# Patient Record
Sex: MALE | URBAN - METROPOLITAN AREA
[De-identification: names, ages, dates, MRNs, and addresses within clinical notes are randomized per-mention and may not be internally consistent; named-entity substitution may affect disease eponyms.]

---

## 2020-09-20 ENCOUNTER — APPOINTMENT (OUTPATIENT)
Dept: RADIOLOGY | Facility: MEDICAL CENTER | Age: 60
DRG: 177 | End: 2020-09-20

## 2020-09-20 ENCOUNTER — HOSPITAL ENCOUNTER (INPATIENT)
Facility: MEDICAL CENTER | Age: 60
LOS: 4 days | DRG: 177 | End: 2020-09-24
Attending: EMERGENCY MEDICINE | Admitting: HOSPITALIST

## 2020-09-20 DIAGNOSIS — J12.82 PNEUMONIA DUE TO COVID-19 VIRUS: ICD-10-CM

## 2020-09-20 DIAGNOSIS — U07.1 COVID-19 VIRUS INFECTION: ICD-10-CM

## 2020-09-20 DIAGNOSIS — R09.02 HYPOXIA: ICD-10-CM

## 2020-09-20 DIAGNOSIS — J98.4 PNEUMONITIS: ICD-10-CM

## 2020-09-20 DIAGNOSIS — U07.1 ACUTE RESPIRATORY DISEASE DUE TO 2019 NOVEL CORONAVIRUS: ICD-10-CM

## 2020-09-20 DIAGNOSIS — J06.9 ACUTE RESPIRATORY DISEASE DUE TO 2019 NOVEL CORONAVIRUS: ICD-10-CM

## 2020-09-20 DIAGNOSIS — U07.1 PNEUMONIA DUE TO COVID-19 VIRUS: ICD-10-CM

## 2020-09-20 PROBLEM — E11.9 DM (DIABETES MELLITUS) (HCC): Status: ACTIVE | Noted: 2020-09-20

## 2020-09-20 LAB
ALBUMIN SERPL BCP-MCNC: 3.6 G/DL (ref 3.2–4.9)
ALBUMIN/GLOB SERPL: 1 G/DL
ALP SERPL-CCNC: 66 U/L (ref 30–99)
ALT SERPL-CCNC: 21 U/L (ref 2–50)
ANION GAP SERPL CALC-SCNC: 17 MMOL/L (ref 7–16)
APTT PPP: 27.7 SEC (ref 24.7–36)
AST SERPL-CCNC: 28 U/L (ref 12–45)
BASOPHILS # BLD AUTO: 0 % (ref 0–1.8)
BASOPHILS # BLD: 0 K/UL (ref 0–0.12)
BILIRUB SERPL-MCNC: 0.5 MG/DL (ref 0.1–1.5)
BUN SERPL-MCNC: 13 MG/DL (ref 8–22)
CALCIUM SERPL-MCNC: 9 MG/DL (ref 8.5–10.5)
CHLORIDE SERPL-SCNC: 97 MMOL/L (ref 96–112)
CK SERPL-CCNC: 67 U/L (ref 0–154)
CO2 SERPL-SCNC: 21 MMOL/L (ref 20–33)
CREAT SERPL-MCNC: 0.7 MG/DL (ref 0.5–1.4)
CRP SERPL HS-MCNC: 10.19 MG/DL (ref 0–0.75)
D DIMER PPP IA.FEU-MCNC: 0.56 UG/ML (FEU) (ref 0–0.5)
EKG IMPRESSION: NORMAL
EOSINOPHIL # BLD AUTO: 0 K/UL (ref 0–0.51)
EOSINOPHIL NFR BLD: 0 % (ref 0–6.9)
ERYTHROCYTE [DISTWIDTH] IN BLOOD BY AUTOMATED COUNT: 38.5 FL (ref 35.9–50)
ERYTHROCYTE [SEDIMENTATION RATE] IN BLOOD BY WESTERGREN METHOD: 76 MM/HOUR (ref 0–20)
EST. AVERAGE GLUCOSE BLD GHB EST-MCNC: 269 MG/DL
FERRITIN SERPL-MCNC: 1797 NG/ML (ref 22–322)
GLOBULIN SER CALC-MCNC: 3.5 G/DL (ref 1.9–3.5)
GLUCOSE BLD-MCNC: 288 MG/DL (ref 65–99)
GLUCOSE SERPL-MCNC: 264 MG/DL (ref 65–99)
HBA1C MFR BLD: 11 % (ref 0–5.6)
HCT VFR BLD AUTO: 45.1 % (ref 42–52)
HGB BLD-MCNC: 15.6 G/DL (ref 14–18)
INR PPP: 1.08 (ref 0.87–1.13)
LACTATE BLD-SCNC: 1.7 MMOL/L (ref 0.5–2)
LDH SERPL L TO P-CCNC: 351 U/L (ref 107–266)
LG PLATELETS BLD QL SMEAR: NORMAL
LYMPHOCYTES # BLD AUTO: 1.24 K/UL (ref 1–4.8)
LYMPHOCYTES NFR BLD: 17.5 % (ref 22–41)
MAGNESIUM SERPL-MCNC: 1.9 MG/DL (ref 1.5–2.5)
MANUAL DIFF BLD: NORMAL
MCH RBC QN AUTO: 29.7 PG (ref 27–33)
MCHC RBC AUTO-ENTMCNC: 34.6 G/DL (ref 33.7–35.3)
MCV RBC AUTO: 85.7 FL (ref 81.4–97.8)
MONOCYTES # BLD AUTO: 0.56 K/UL (ref 0–0.85)
MONOCYTES NFR BLD AUTO: 7.9 % (ref 0–13.4)
MORPHOLOGY BLD-IMP: NORMAL
NEUTROPHILS # BLD AUTO: 5.3 K/UL (ref 1.82–7.42)
NEUTROPHILS NFR BLD: 74.6 % (ref 44–72)
NRBC # BLD AUTO: 0 K/UL
NRBC BLD-RTO: 0 /100 WBC
NT-PROBNP SERPL IA-MCNC: 170 PG/ML (ref 0–125)
PHOSPHATE SERPL-MCNC: 2.4 MG/DL (ref 2.5–4.5)
PLATELET # BLD AUTO: 255 K/UL (ref 164–446)
PLATELET BLD QL SMEAR: NORMAL
PMV BLD AUTO: 10 FL (ref 9–12.9)
POTASSIUM SERPL-SCNC: 3.4 MMOL/L (ref 3.6–5.5)
PROCALCITONIN SERPL-MCNC: <0.05 NG/ML
PROT SERPL-MCNC: 7.1 G/DL (ref 6–8.2)
PROTHROMBIN TIME: 14.3 SEC (ref 12–14.6)
RBC # BLD AUTO: 5.26 M/UL (ref 4.7–6.1)
RBC BLD AUTO: PRESENT
SODIUM SERPL-SCNC: 135 MMOL/L (ref 135–145)
TROPONIN T SERPL-MCNC: 13 NG/L (ref 6–19)
WBC # BLD AUTO: 7.1 K/UL (ref 4.8–10.8)

## 2020-09-20 PROCEDURE — 99285 EMERGENCY DEPT VISIT HI MDM: CPT

## 2020-09-20 PROCEDURE — 80053 COMPREHEN METABOLIC PANEL: CPT

## 2020-09-20 PROCEDURE — 85007 BL SMEAR W/DIFF WBC COUNT: CPT

## 2020-09-20 PROCEDURE — 83520 IMMUNOASSAY QUANT NOS NONAB: CPT

## 2020-09-20 PROCEDURE — 36415 COLL VENOUS BLD VENIPUNCTURE: CPT

## 2020-09-20 PROCEDURE — 83880 ASSAY OF NATRIURETIC PEPTIDE: CPT

## 2020-09-20 PROCEDURE — 82728 ASSAY OF FERRITIN: CPT

## 2020-09-20 PROCEDURE — 83615 LACTATE (LD) (LDH) ENZYME: CPT

## 2020-09-20 PROCEDURE — 87040 BLOOD CULTURE FOR BACTERIA: CPT

## 2020-09-20 PROCEDURE — 770006 HCHG ROOM/CARE - MED/SURG/GYN SEMI*

## 2020-09-20 PROCEDURE — 82962 GLUCOSE BLOOD TEST: CPT

## 2020-09-20 PROCEDURE — 85652 RBC SED RATE AUTOMATED: CPT

## 2020-09-20 PROCEDURE — 99221 1ST HOSP IP/OBS SF/LOW 40: CPT | Performed by: HOSPITALIST

## 2020-09-20 PROCEDURE — 93005 ELECTROCARDIOGRAM TRACING: CPT | Performed by: EMERGENCY MEDICINE

## 2020-09-20 PROCEDURE — 83735 ASSAY OF MAGNESIUM: CPT

## 2020-09-20 PROCEDURE — 82550 ASSAY OF CK (CPK): CPT

## 2020-09-20 PROCEDURE — A9270 NON-COVERED ITEM OR SERVICE: HCPCS | Performed by: HOSPITALIST

## 2020-09-20 PROCEDURE — 84484 ASSAY OF TROPONIN QUANT: CPT

## 2020-09-20 PROCEDURE — 86140 C-REACTIVE PROTEIN: CPT

## 2020-09-20 PROCEDURE — 85730 THROMBOPLASTIN TIME PARTIAL: CPT

## 2020-09-20 PROCEDURE — 85610 PROTHROMBIN TIME: CPT

## 2020-09-20 PROCEDURE — 83605 ASSAY OF LACTIC ACID: CPT

## 2020-09-20 PROCEDURE — 71045 X-RAY EXAM CHEST 1 VIEW: CPT

## 2020-09-20 PROCEDURE — 84145 PROCALCITONIN (PCT): CPT

## 2020-09-20 PROCEDURE — 85379 FIBRIN DEGRADATION QUANT: CPT

## 2020-09-20 PROCEDURE — 83036 HEMOGLOBIN GLYCOSYLATED A1C: CPT

## 2020-09-20 PROCEDURE — 85027 COMPLETE CBC AUTOMATED: CPT

## 2020-09-20 PROCEDURE — 84100 ASSAY OF PHOSPHORUS: CPT

## 2020-09-20 PROCEDURE — 700102 HCHG RX REV CODE 250 W/ 637 OVERRIDE(OP): Performed by: HOSPITALIST

## 2020-09-20 RX ORDER — BISACODYL 10 MG
10 SUPPOSITORY, RECTAL RECTAL
Status: DISCONTINUED | OUTPATIENT
Start: 2020-09-20 | End: 2020-09-24 | Stop reason: HOSPADM

## 2020-09-20 RX ORDER — INSULIN GLARGINE 100 [IU]/ML
15 INJECTION, SOLUTION SUBCUTANEOUS EVERY EVENING
Status: DISCONTINUED | OUTPATIENT
Start: 2020-09-20 | End: 2020-09-21

## 2020-09-20 RX ORDER — POLYETHYLENE GLYCOL 3350 17 G/17G
1 POWDER, FOR SOLUTION ORAL
Status: DISCONTINUED | OUTPATIENT
Start: 2020-09-20 | End: 2020-09-24 | Stop reason: HOSPADM

## 2020-09-20 RX ORDER — DEXTROSE MONOHYDRATE 25 G/50ML
50 INJECTION, SOLUTION INTRAVENOUS
Status: DISCONTINUED | OUTPATIENT
Start: 2020-09-20 | End: 2020-09-21

## 2020-09-20 RX ORDER — DEXAMETHASONE 6 MG/1
6 TABLET ORAL DAILY
Status: DISCONTINUED | OUTPATIENT
Start: 2020-09-21 | End: 2020-09-24 | Stop reason: HOSPADM

## 2020-09-20 RX ORDER — BENZONATATE 200 MG/1
200 CAPSULE ORAL 3 TIMES DAILY PRN
Status: ON HOLD | COMMUNITY
End: 2020-09-24 | Stop reason: SDUPTHER

## 2020-09-20 RX ORDER — AMOXICILLIN 250 MG
2 CAPSULE ORAL 2 TIMES DAILY
Status: DISCONTINUED | OUTPATIENT
Start: 2020-09-20 | End: 2020-09-24 | Stop reason: HOSPADM

## 2020-09-20 RX ORDER — ACETAMINOPHEN 325 MG/1
650 TABLET ORAL EVERY 6 HOURS PRN
Status: DISCONTINUED | OUTPATIENT
Start: 2020-09-20 | End: 2020-09-24 | Stop reason: HOSPADM

## 2020-09-20 RX ADMIN — INSULIN GLARGINE 15 UNITS: 100 INJECTION, SOLUTION SUBCUTANEOUS at 23:06

## 2020-09-20 RX ADMIN — DOCUSATE SODIUM 50 MG AND SENNOSIDES 8.6 MG 2 TABLET: 8.6; 5 TABLET, FILM COATED ORAL at 23:08

## 2020-09-20 SDOH — ECONOMIC STABILITY: FOOD INSECURITY: WITHIN THE PAST 12 MONTHS, YOU WORRIED THAT YOUR FOOD WOULD RUN OUT BEFORE YOU GOT MONEY TO BUY MORE.: PATIENT DECLINED

## 2020-09-20 SDOH — HEALTH STABILITY: MENTAL HEALTH: HOW OFTEN DO YOU HAVE 6 OR MORE DRINKS ON ONE OCCASION?: NEVER

## 2020-09-20 SDOH — HEALTH STABILITY: MENTAL HEALTH: HOW OFTEN DO YOU HAVE A DRINK CONTAINING ALCOHOL?: NEVER

## 2020-09-20 SDOH — ECONOMIC STABILITY: FOOD INSECURITY: WITHIN THE PAST 12 MONTHS, THE FOOD YOU BOUGHT JUST DIDN'T LAST AND YOU DIDN'T HAVE MONEY TO GET MORE.: PATIENT DECLINED

## 2020-09-20 SDOH — ECONOMIC STABILITY: TRANSPORTATION INSECURITY
IN THE PAST 12 MONTHS, HAS THE LACK OF TRANSPORTATION KEPT YOU FROM MEDICAL APPOINTMENTS OR FROM GETTING MEDICATIONS?: PATIENT DECLINED

## 2020-09-20 SDOH — HEALTH STABILITY: MENTAL HEALTH: HOW MANY STANDARD DRINKS CONTAINING ALCOHOL DO YOU HAVE ON A TYPICAL DAY?: PATIENT DECLINED

## 2020-09-20 SDOH — ECONOMIC STABILITY: TRANSPORTATION INSECURITY
IN THE PAST 12 MONTHS, HAS LACK OF TRANSPORTATION KEPT YOU FROM MEETINGS, WORK, OR FROM GETTING THINGS NEEDED FOR DAILY LIVING?: PATIENT DECLINED

## 2020-09-20 ASSESSMENT — COGNITIVE AND FUNCTIONAL STATUS - GENERAL
DAILY ACTIVITIY SCORE: 24
MOBILITY SCORE: 24
SUGGESTED CMS G CODE MODIFIER DAILY ACTIVITY: CH
SUGGESTED CMS G CODE MODIFIER MOBILITY: CH
MOBILITY SCORE: 24
SUGGESTED CMS G CODE MODIFIER MOBILITY: CH

## 2020-09-20 ASSESSMENT — PATIENT HEALTH QUESTIONNAIRE - PHQ9
1. LITTLE INTEREST OR PLEASURE IN DOING THINGS: NOT AT ALL
2. FEELING DOWN, DEPRESSED, IRRITABLE, OR HOPELESS: NOT AT ALL
SUM OF ALL RESPONSES TO PHQ9 QUESTIONS 1 AND 2: 0

## 2020-09-20 ASSESSMENT — FIBROSIS 4 INDEX: FIB4 SCORE: 1.49

## 2020-09-20 ASSESSMENT — LIFESTYLE VARIABLES
TOTAL SCORE: 0
TOTAL SCORE: 0
EVER HAD A DRINK FIRST THING IN THE MORNING TO STEADY YOUR NERVES TO GET RID OF A HANGOVER: NO
EVER FELT BAD OR GUILTY ABOUT YOUR DRINKING: NO
TOTAL SCORE: 0
CONSUMPTION TOTAL: NEGATIVE
DOES PATIENT WANT TO TALK TO SOMEONE ABOUT QUITTING: NO
HOW MANY TIMES IN THE PAST YEAR HAVE YOU HAD 5 OR MORE DRINKS IN A DAY: 0
ON A TYPICAL DAY WHEN YOU DRINK ALCOHOL HOW MANY DRINKS DO YOU HAVE: 0
DOES PATIENT WANT TO STOP DRINKING: YES
HAVE PEOPLE ANNOYED YOU BY CRITICIZING YOUR DRINKING: NO
HAVE YOU EVER FELT YOU SHOULD CUT DOWN ON YOUR DRINKING: NO
AVERAGE NUMBER OF DAYS PER WEEK YOU HAVE A DRINK CONTAINING ALCOHOL: 0
ALCOHOL_USE: NO

## 2020-09-21 PROBLEM — J12.82 PNEUMONIA DUE TO COVID-19 VIRUS: Status: ACTIVE | Noted: 2020-09-21

## 2020-09-21 PROBLEM — U07.1 PNEUMONIA DUE TO COVID-19 VIRUS: Status: ACTIVE | Noted: 2020-09-21

## 2020-09-21 LAB
ANION GAP SERPL CALC-SCNC: 13 MMOL/L (ref 7–16)
BUN SERPL-MCNC: 14 MG/DL (ref 8–22)
CALCIUM SERPL-MCNC: 8.7 MG/DL (ref 8.5–10.5)
CHLORIDE SERPL-SCNC: 99 MMOL/L (ref 96–112)
CO2 SERPL-SCNC: 25 MMOL/L (ref 20–33)
CREAT SERPL-MCNC: 0.81 MG/DL (ref 0.5–1.4)
ERYTHROCYTE [DISTWIDTH] IN BLOOD BY AUTOMATED COUNT: 39 FL (ref 35.9–50)
GLUCOSE BLD-MCNC: 312 MG/DL (ref 65–99)
GLUCOSE BLD-MCNC: 320 MG/DL (ref 65–99)
GLUCOSE BLD-MCNC: 322 MG/DL (ref 65–99)
GLUCOSE BLD-MCNC: 402 MG/DL (ref 65–99)
GLUCOSE SERPL-MCNC: 320 MG/DL (ref 65–99)
HCT VFR BLD AUTO: 42.2 % (ref 42–52)
HGB BLD-MCNC: 14.1 G/DL (ref 14–18)
LACTATE BLD-SCNC: 1.5 MMOL/L (ref 0.5–2)
MCH RBC QN AUTO: 29 PG (ref 27–33)
MCHC RBC AUTO-ENTMCNC: 33.4 G/DL (ref 33.7–35.3)
MCV RBC AUTO: 86.8 FL (ref 81.4–97.8)
PLATELET # BLD AUTO: 243 K/UL (ref 164–446)
PMV BLD AUTO: 10.4 FL (ref 9–12.9)
POTASSIUM SERPL-SCNC: 3.8 MMOL/L (ref 3.6–5.5)
RBC # BLD AUTO: 4.86 M/UL (ref 4.7–6.1)
SODIUM SERPL-SCNC: 137 MMOL/L (ref 135–145)
WBC # BLD AUTO: 7.3 K/UL (ref 4.8–10.8)

## 2020-09-21 PROCEDURE — 83605 ASSAY OF LACTIC ACID: CPT

## 2020-09-21 PROCEDURE — 770006 HCHG ROOM/CARE - MED/SURG/GYN SEMI*

## 2020-09-21 PROCEDURE — A9270 NON-COVERED ITEM OR SERVICE: HCPCS | Performed by: HOSPITALIST

## 2020-09-21 PROCEDURE — 99233 SBSQ HOSP IP/OBS HIGH 50: CPT | Performed by: HOSPITALIST

## 2020-09-21 PROCEDURE — 82962 GLUCOSE BLOOD TEST: CPT | Mod: 91

## 2020-09-21 PROCEDURE — 80048 BASIC METABOLIC PNL TOTAL CA: CPT

## 2020-09-21 PROCEDURE — 36415 COLL VENOUS BLD VENIPUNCTURE: CPT

## 2020-09-21 PROCEDURE — 700102 HCHG RX REV CODE 250 W/ 637 OVERRIDE(OP): Performed by: HOSPITALIST

## 2020-09-21 PROCEDURE — 85027 COMPLETE CBC AUTOMATED: CPT

## 2020-09-21 PROCEDURE — 700111 HCHG RX REV CODE 636 W/ 250 OVERRIDE (IP): Performed by: HOSPITALIST

## 2020-09-21 RX ORDER — DEXTROSE MONOHYDRATE 25 G/50ML
50 INJECTION, SOLUTION INTRAVENOUS
Status: DISCONTINUED | OUTPATIENT
Start: 2020-09-21 | End: 2020-09-22

## 2020-09-21 RX ORDER — INSULIN GLARGINE 100 [IU]/ML
20 INJECTION, SOLUTION SUBCUTANEOUS EVERY EVENING
Status: DISCONTINUED | OUTPATIENT
Start: 2020-09-21 | End: 2020-09-22

## 2020-09-21 RX ADMIN — ENOXAPARIN SODIUM 40 MG: 40 INJECTION SUBCUTANEOUS at 04:44

## 2020-09-21 RX ADMIN — INSULIN LISPRO 4 UNITS: 100 INJECTION, SOLUTION INTRAVENOUS; SUBCUTANEOUS at 08:07

## 2020-09-21 RX ADMIN — DOCUSATE SODIUM 50 MG AND SENNOSIDES 8.6 MG 2 TABLET: 8.6; 5 TABLET, FILM COATED ORAL at 04:44

## 2020-09-21 RX ADMIN — INSULIN LISPRO 3 UNITS: 100 INJECTION, SOLUTION INTRAVENOUS; SUBCUTANEOUS at 08:06

## 2020-09-21 RX ADMIN — INSULIN GLARGINE 20 UNITS: 100 INJECTION, SOLUTION SUBCUTANEOUS at 17:31

## 2020-09-21 RX ADMIN — INSULIN LISPRO 3 UNITS: 100 INJECTION, SOLUTION INTRAVENOUS; SUBCUTANEOUS at 13:09

## 2020-09-21 RX ADMIN — INSULIN HUMAN 10 UNITS: 100 INJECTION, SUSPENSION SUBCUTANEOUS at 15:34

## 2020-09-21 RX ADMIN — INSULIN LISPRO 6 UNITS: 100 INJECTION, SOLUTION INTRAVENOUS; SUBCUTANEOUS at 13:07

## 2020-09-21 RX ADMIN — DEXAMETHASONE 6 MG: 6 TABLET ORAL at 04:44

## 2020-09-21 ASSESSMENT — ENCOUNTER SYMPTOMS
NEUROLOGICAL NEGATIVE: 1
FEVER: 0
EYES NEGATIVE: 1
SPUTUM PRODUCTION: 0
CHILLS: 0
COUGH: 1
GASTROINTESTINAL NEGATIVE: 1
SHORTNESS OF BREATH: 1
CARDIOVASCULAR NEGATIVE: 1
MUSCULOSKELETAL NEGATIVE: 1
NERVOUS/ANXIOUS: 1

## 2020-09-21 ASSESSMENT — COPD QUESTIONNAIRES
DURING THE PAST 4 WEEKS HOW MUCH DID YOU FEEL SHORT OF BREATH: NONE/LITTLE OF THE TIME
DO YOU EVER COUGH UP ANY MUCUS OR PHLEGM?: NO/ONLY WITH OCCASIONAL COLDS OR INFECTIONS
HAVE YOU SMOKED AT LEAST 100 CIGARETTES IN YOUR ENTIRE LIFE: NO/DON'T KNOW
COPD SCREENING SCORE: 2

## 2020-09-21 NOTE — ED PROVIDER NOTES
ED Provider Note    Scribed for Flavio Bustos M.D. by Carol Vargas. 9/20/2020, 7:28 PM.    Primary care provider: No primary care provider on file.  Means of arrival: Walk-In  History obtained from: Patient  History limited by: None    CHIEF COMPLAINT  No chief complaint on file.    HPI  Romel Sykes is a 60 y.o. male who presents to the Emergency Department for a cough onset 10 days ago. The patient states that he was tested for Covid-19 and received a positive test result two days ago. Over the week the patient developed a nonproductive cough and a slight sore throat. He reports his fever comes and goes, denies losing his sense of taste or smell. Prior to arrival the patient had a pulse ox in the 80's and was prompted to come into the [facility] ED tonight for further evaluation. No alleviating or exacerbating factors were noted. Patient has associated sore throat, headache, body aches, fever, nausea, and diarrhea, but denies loss of taste or smell. Patient also denies using alcohol, drugs, or smoking. He is allergic Penicillins and Codeine, and does not take any daily medications.     REVIEW OF SYSTEMS  Pertinent positives include cough, sore throat, headache, body aches, fever, nausea, and diarrhea. Pertinent negatives include loss of taste or smell. All other systems negative.    PAST MEDICAL HISTORY   has a past medical history of Diabetes (HCC).None noted     SURGICAL HISTORY  patient denies any surgical history    SOCIAL HISTORY  Social History     Tobacco Use   • Smoking status: None noted   Substance Use Topics   • Alcohol use: None noted   • Drug use: None noted       Social History     Substance and Sexual Activity   Drug Use None noted       FAMILY HISTORY  No family history noted     CURRENT MEDICATIONS  Home Medications     Reviewed by Randa Roy (Pharmacy Tech) on 09/20/20 at 1952  Med List Status: Complete   Medication Last Dose Status   Ascorbic Acid (VITAMIN C PO)  "9/20/2020 Active   benzonatate (TESSALON) 200 MG capsule 9/19/2020 Active   VITAMIN D PO 9/20/2020 Active                ALLERGIES  Allergies   Allergen Reactions   • Codeine Rash     RASH   • Penicillins Rash     rash       PHYSICAL EXAM  VITAL SIGNS: /79   Pulse 88   Temp 37.4 °C (99.3 °F) (Temporal)   Resp (!) 22   Ht 1.778 m (5' 10\")   Wt 104.3 kg (230 lb)   SpO2 96%   BMI 33.00 kg/m²     Constitutional: Well developed, Well nourished, Mild distress.   HENT: Normocephalic, Atraumatic, mask in place  Eyes: Conjunctiva normal, No discharge.   Neck: Supple, No stridor, no cervical spine tenderness   Cardiovascular: Normal heart rate, Normal rhythm, No murmurs, equal pulses.   Pulmonary: Normal breath sounds, No respiratory distress, No wheezing, No rales, No rhonchi.  Chest: No chest wall tenderness or deformity.   Abdomen:Soft, No tenderness, No masses, no rebound, no guarding.   Musculoskeletal: No edema to the bilatral lower extremities, No calf tenderness, No major deformities noted, No tenderness.   Skin: Warm, Dry, No erythema, No rash.   Neurologic: Alert & oriented x 3, Normal motor function,  No focal deficits noted.   Psychiatric: Affect normal, Judgment normal, Mood normal.     LABS  Results for orders placed or performed during the hospital encounter of 09/20/20   CBC WITH DIFFERENTIAL   Result Value Ref Range    WBC 7.1 4.8 - 10.8 K/uL    RBC 5.26 4.70 - 6.10 M/uL    Hemoglobin 15.6 14.0 - 18.0 g/dL    Hematocrit 45.1 42.0 - 52.0 %    MCV 85.7 81.4 - 97.8 fL    MCH 29.7 27.0 - 33.0 pg    MCHC 34.6 33.7 - 35.3 g/dL    RDW 38.5 35.9 - 50.0 fL    Platelet Count 255 164 - 446 K/uL    MPV 10.0 9.0 - 12.9 fL    Neutrophils-Polys 74.60 (H) 44.00 - 72.00 %    Lymphocytes 17.50 (L) 22.00 - 41.00 %    Monocytes 7.90 0.00 - 13.40 %    Eosinophils 0.00 0.00 - 6.90 %    Basophils 0.00 0.00 - 1.80 %    Nucleated RBC 0.00 /100 WBC    Neutrophils (Absolute) 5.30 1.82 - 7.42 K/uL    Lymphs (Absolute) 1.24 " 1.00 - 4.80 K/uL    Monos (Absolute) 0.56 0.00 - 0.85 K/uL    Eos (Absolute) 0.00 0.00 - 0.51 K/uL    Baso (Absolute) 0.00 0.00 - 0.12 K/uL    NRBC (Absolute) 0.00 K/uL   COMP METABOLIC PANEL   Result Value Ref Range    Sodium 135 135 - 145 mmol/L    Potassium 3.4 (L) 3.6 - 5.5 mmol/L    Chloride 97 96 - 112 mmol/L    Co2 21 20 - 33 mmol/L    Anion Gap 17.0 (H) 7.0 - 16.0    Glucose 264 (H) 65 - 99 mg/dL    Bun 13 8 - 22 mg/dL    Creatinine 0.70 0.50 - 1.40 mg/dL    Calcium 9.0 8.5 - 10.5 mg/dL    AST(SGOT) 28 12 - 45 U/L    ALT(SGPT) 21 2 - 50 U/L    Alkaline Phosphatase 66 30 - 99 U/L    Total Bilirubin 0.5 0.1 - 1.5 mg/dL    Albumin 3.6 3.2 - 4.9 g/dL    Total Protein 7.1 6.0 - 8.2 g/dL    Globulin 3.5 1.9 - 3.5 g/dL    A-G Ratio 1.0 g/dL   DIFFERENTIAL MANUAL   Result Value Ref Range    Manual Diff Status PERFORMED    PERIPHERAL SMEAR REVIEW   Result Value Ref Range    Peripheral Smear Review see below    PLATELET ESTIMATE   Result Value Ref Range    Plt Estimation Normal    MORPHOLOGY   Result Value Ref Range    RBC Morphology Present     Large Platelets 1+    ESTIMATED GFR   Result Value Ref Range    GFR If African American >60 >60 mL/min/1.73 m 2    GFR If Non African American >60 >60 mL/min/1.73 m 2   Magnesium   Result Value Ref Range    Magnesium 1.9 1.5 - 2.5 mg/dL   Phosphorus   Result Value Ref Range    Phosphorus 2.4 (L) 2.5 - 4.5 mg/dL   Ferritin   Result Value Ref Range    Ferritin 1797.0 (H) 22.0 - 322.0 ng/mL   Troponin   Result Value Ref Range    Troponin T 13 6 - 19 ng/L   Creatinine Kinase   Result Value Ref Range    CPK Total 67 0 - 154 U/L   D-Dimer   Result Value Ref Range    D-Dimer Screen 0.56 (H) 0.00 - 0.50 ug/mL (FEU)   CRP Quantitative (Non-Cardiac)   Result Value Ref Range    Stat C-Reactive Protein 10.19 (H) 0.00 - 0.75 mg/dL   proBrain Natriuretic Peptide, NT   Result Value Ref Range    NT-proBNP 170 (H) 0 - 125 pg/mL   Sed Rate   Result Value Ref Range    Sed Rate Westergren 76 (H)  0 - 20 mm/hour   Procalcitonin   Result Value Ref Range    Procalcitonin <0.05 <0.25 ng/mL   LDH   Result Value Ref Range    LDH Total 351 (H) 107 - 266 U/L   Lactic Acid   Result Value Ref Range    Lactic Acid 1.7 0.5 - 2.0 mmol/L   aPTT   Result Value Ref Range    APTT 27.7 24.7 - 36.0 sec   Prothrombin Time   Result Value Ref Range    PT 14.3 12.0 - 14.6 sec    INR 1.08 0.87 - 1.13   HEMOGLOBIN A1C   Result Value Ref Range    Glycohemoglobin 11.0 (H) 0.0 - 5.6 %    Est Avg Glucose 269 mg/dL   EKG   Result Value Ref Range    Report       [facility] Emergency Dept.    Test Date:  2020  Pt Name:    LESLEY HUMPHRIES            Department: ER  MRN:        [id number]                      Room:       [facility]  Gender:     Male                         Technician: [id number]  :        1960                   Requested By:ER TRIAGE PROTOCOL  Order #:    [id number]                    Reading MD: RICARDO BERGER MD    Measurements  Intervals                                Axis  Rate:       91                           P:          29  AR:         140                          QRS:        -19  QRSD:       86                           T:          -4  QT:         360  QTc:        443    Interpretive Statements  SINUS RHYTHM, leftward axis, rate 91  EARLY PRECORDIAL R/S TRANSITION  LEFT VENTRICULAR HYPERTROPHY  CONSIDER ANTERIOR INFARCT  BORDERLINE T ABNORMALITIES, INFERIOR LEADS  No previous ECG available for comparison  Electronically Signed On 2020 20:27:08 PDT by RICARDO BERGER MD        All labs reviewed by me.    RADIOLOGY  DX-CHEST-PORTABLE (1 VIEW)   Final Result      Interstitial and patchy peripheral airspace opacities raises concern for pneumonitis and Covid 19. Recommend clinical correlation. No pleural effusion.        The radiologist's interpretation of all radiological studies have been reviewed by me.    COURSE & MEDICAL DECISION MAKING  Pertinent Labs & Imaging studies  reviewed. (See chart for details)    8:15 PM - Patient seen and examined at bedside. Discussed plan of care with patient. I informed them that labs and imaging will be ordered to evaluate symptoms. The patient is understanding and agreeable with plan of care. Ordered DX-Chest-Portable (1 View), Lactic Acid, Magnesium, Phosphorous, Ferritin, Troponin, Creatine Kinase, D-Dimer, CRP Quantitative (Non-Cardiac), proBrain Natriuretic Peptide NT, Sed Rate, Procalcitonin, LDH, Interleukin 6, aPTT, Prothrombin Time, Blood Culture, CBC w/ Differential, CMP, Differential Manual, Peripheral Smear Review, Platelet Estimate, Morphology, and Estimated GFR to evaluate his symptoms. The differential diagnoses include but are not limited to: COVID-19, pneumonia, myocardial infarction    8:26 PM Paged Hospitalist    8:27 PM I discussed the patient's case and the above findings with Dr. Renny Vickers (Hospitalist) who agrees with the plan of care and agrees to hospitalize the patient due to his current condition.      8:31 PM Patient was reevaluated at bedside. Discussed lab and radiology results with the patient and informed them that there were acute and abnormal findings as noted above. The plan of care was discussed with the patient. He was informed of the discussion with Dr. Renny Vickers. Patient is understanding and agreeable to the plan of care. Patient had the opportunity to ask any questions. The plan for hospitalization was discussed with the patient due to his current condition. He is understanding and agreeable to the plan of care.     I verified that the patient was wearing a mask and I was wearing appropriate PPE every time I entered the room. The patient's mask was on the patient at all times during my encounter except for a brief view of the oropharynx.     Medical Decision Making: This point time it appears the patient most likely has Covid19 causing his hypoxia.  Patient is only requiring a couple liters of oxygen and is  otherwise stable.  Chest x-ray does not show any significant loculated pneumonia.  Patient was given dexamethasone secondary to his hypoxia.    DISPOSITION:  Patient will be hospitalized by Dr. Renny Vickers in guarded condition.    FINAL IMPRESSION  1. Hypoxia    2. COVID-19 virus infection    3. Pneumonitis          Carol BOYKIN (Scribe), am scribing for, and in the presence of, Flavio Bustos M.D.    Electronically signed by: Carol Vargas (Alexandraibbasia), 9/20/2020    Flavio BOYKIN M.D. personally performed the services described in this documentation, as scribed by Carol Vargas in my presence, and it is both accurate and complete. C    The note accurately reflects work and decisions made by me.  Flavio Bustos M.D.  9/20/2020  11:28 PM

## 2020-09-21 NOTE — CARE PLAN
Problem: Infection  Goal: Will remain free from infection  Outcome: PROGRESSING AS EXPECTED     Problem: Respiratory:  Goal: Respiratory status will improve  Outcome: PROGRESSING AS EXPECTED     Patient afebrile, will continue to monitor.  Patient requires extra O2, currently at 5L NC.

## 2020-09-21 NOTE — PROGRESS NOTES
60-year-old male diagnosed covid positive 2 days ago presenting with dyspnea and hypoxia on 4 L nasal cannula    Dr. Sauer will evaluate patient for admission

## 2020-09-21 NOTE — CARE PLAN
Assumed day shift care at start of shift  Patient a+o x 4, denies pain  93% on 4lpm via nasal cannula, denies sob, ls=ctab, denies cough  IS up to 500ml, patient encouraged to use throughout the day with a goal of 1000ml - patient agreeable to plan  Vss, afebrile  No needs  at this time, wctm    Fall precautions/hourly rounding maintained, call light within reach and functioning, all items within reach.  Patient encouraged to call for assistance, poc reviewed with patient, ?'s/concerns answered.       Problem: Knowledge Deficit  Goal: Knowledge of disease process/condition, treatment plan, diagnostic tests, and medications will improve  Outcome: PROGRESSING AS EXPECTED     Problem: Respiratory:  Goal: Respiratory status will improve  Outcome: PROGRESSING AS EXPECTED

## 2020-09-21 NOTE — ED NOTES
Patient came back from triage on 4L. Patient states he was monitoring himself at home. O2 sats dropped down to 85%. IV placed. Labs drawn.

## 2020-09-21 NOTE — ASSESSMENT & PLAN NOTE
"\"Patient with prior poor control  Continue with insulin regimen, adjust as indicated\"  I see no diabetes medications on home meds  Ordered metformin  May need insulin at discharge  Urinalysis showed proteinuria so there is indication for ACEI, started him on low dose.  "

## 2020-09-21 NOTE — PROGRESS NOTES
[facility] Medicine Daily Progress Note    Date of Service  9/21/2020    Chief Complaint  60 y.o. male admitted 9/20/2020 with history of diabetes with poor control, had symptoms approximately a week ago and returned positive as an outpatient last Friday, he checked his saturations at home and then became hypoxic triggering him to return to the hospital, the patient placed on 4 L of oxygen, admitted for COVID pneumonia.    Hospital Course    60-year-old diabetic with COVID-19 pneumonia      Interval Problem Update  Patient seen and examined today.    Patient tolerating treatment and therapies.  All Data, Medication data reviewed.  Case discussed with nursing as available.  Plan of Care reviewed with patient and notified of changes.  9/21 the patient feels okay, he denies fevers, no nausea vomiting, mild dry cough, still requiring oxygen.    Consultants/Specialty  None    Code Status  Full Code    Disposition  TBD    Review of Systems  Review of Systems   Constitutional: Positive for malaise/fatigue. Negative for chills and fever.   HENT: Negative.    Eyes: Negative.    Respiratory: Positive for cough and shortness of breath. Negative for sputum production.    Cardiovascular: Negative.    Gastrointestinal: Negative.    Genitourinary: Negative.    Musculoskeletal: Negative.    Skin: Negative.    Neurological: Negative.    Endo/Heme/Allergies: Negative.    Psychiatric/Behavioral: The patient is nervous/anxious.    All other systems reviewed and are negative.       Physical Exam  Temp:  [36.3 °C (97.3 °F)-37.4 °C (99.3 °F)] 37 °C (98.6 °F)  Pulse:  [86-97] 88  Resp:  [18-22] 18  BP: (135-157)/() 146/95  SpO2:  [91 %-98 %] 97 %    Physical Exam  Vitals signs and nursing note reviewed.   Constitutional:       Appearance: He is well-developed.      Comments: Pt seen and examined.   HENT:      Head: Normocephalic and atraumatic.   Eyes:      Pupils: Pupils are equal, round, and reactive to light.   Neck:       Musculoskeletal: Normal range of motion and neck supple.   Cardiovascular:      Rate and Rhythm: Normal rate.      Heart sounds: Normal heart sounds.   Pulmonary:      Effort: Pulmonary effort is normal.      Breath sounds: Rhonchi and rales present.   Abdominal:      General: Bowel sounds are normal.      Palpations: Abdomen is soft.   Genitourinary:     Penis: Normal.    Musculoskeletal: Normal range of motion.   Skin:     General: Skin is warm and dry.   Neurological:      Mental Status: He is alert and oriented to person, place, and time.   Psychiatric:         Behavior: Behavior normal.         Fluids    Intake/Output Summary (Last 24 hours) at 9/21/2020 0745  Last data filed at 9/20/2020 2244  Gross per 24 hour   Intake 1000 ml   Output --   Net 1000 ml       Laboratory  Recent Labs     09/20/20 1900 09/21/20  0036   WBC 7.1 7.3   RBC 5.26 4.86   HEMOGLOBIN 15.6 14.1   HEMATOCRIT 45.1 42.2   MCV 85.7 86.8   MCH 29.7 29.0   MCHC 34.6 33.4*   RDW 38.5 39.0   PLATELETCT 255 243   MPV 10.0 10.4     Recent Labs     09/20/20 1900 09/21/20  0036   SODIUM 135 137   POTASSIUM 3.4* 3.8   CHLORIDE 97 99   CO2 21 25   GLUCOSE 264* 320*   BUN 13 14   CREATININE 0.70 0.81   CALCIUM 9.0 8.7     Recent Labs     09/20/20  2040   APTT 27.7   INR 1.08               Imaging  DX-CHEST-PORTABLE (1 VIEW)   Final Result      Interstitial and patchy peripheral airspace opacities raises concern for pneumonitis and Covid 19. Recommend clinical correlation. No pleural effusion.           Assessment/Plan  Acute respiratory disease due to 2019 novel coronavirus- (present on admission)  Assessment & Plan  1. Patient will be admitted for supplemental oxygenation.    2. I discussed the benefits of proning with the patient, will do this as tolerated to a goal of 4 hours twice daily.    3. D-dimer is only slightly elevated, would not start full dose anticoagulation, prophylaxis dose enoxaparin has been initiated.    4. Patient does not appear  hypovolemic at this time will avoid IV fluids.     5. Given his symptom onset greater than 7 days ago, and his new requirement of 3 L nasal cannula to maintain saturation greater than 90%, he would appear to be a candidate for dexamethasone therapy.  This is per the most recent [facility] clinical treatment guidelines updated 8/6/2020.    Close monitoring of clinical parameters, laboratory parameters, if worsening oxygenation to consider Remdesivir    Pneumonia due to COVID-19 virus- (present on admission)  Assessment & Plan  As above, close monitoring    DM (diabetes mellitus) (HCC)- (present on admission)  Assessment & Plan  Patient with prior poor control  Continue with insulin regimen, adjust as indicated     Plan  Continue with supportive treatment measures as currently undertaken  Close monitoring of clinical and laboratory data  Evaluate daily for need of escalating therapy including Remdesivir  See orders  Medically complex high risk    VTE prophylaxis: Lovenox  I have performed a physical exam and reviewed and updated ROS and Plan today . In review of yesterday's note , there are no changes except as documented above.        Please note that this dictation was created using voice recognition software. I have made every reasonable attempt to correct obvious errors, but I expect that there are errors of grammar and possibly context that I did not discover before finalizing the note.

## 2020-09-21 NOTE — H&P
Hospital Medicine History & Physical Note    Date of Service  9/20/2020    Primary Care Physician  No primary care provider on file.    Consultants  None    Code Status  Full Code    Chief Complaint  Dyspnea    History of Presenting Illness  Patient is a 60-year-old gentleman who recently moved to [place].  His son came to visit approximately 10 to 14 days ago, his son's roommate at that time had symptoms suggestive of COVID but had not been tested, subsequently tested and positive.  Patient himself underwent COVID testing approximately 8 days ago.  Results were positive.  He had worsening symptoms of cough, dyspnea, nausea, whole body aches, diarrhea and headache.  The symptoms worsened today to the point that he could not tolerate his breathlessness at home and presented to our emergency department for further evaluation and work-up.  He was noted to be quite hypoxic on room air.  He was initiated on O2 and presented to the hospitalist service for admission.  Acuity is acute, onset is gradual, severity is severe, timing is variable, no particular alleviating or exacerbating factors, associated symptoms per above, location is generalized and pulmonary.    Review of Systems  All systems reviewed negative except as noted per HPI.    Past Medical History  Patient notes that he has an elevated hemoglobin A1c, but is not taking any medications for currently.  Otherwise no past medical history.    Surgical History   has no past surgical history on file.     Family History  Father with CHF and diabetes.  Otherwise noncontributory.    Social History    Patient does not smoke, use recreational substances, does have an occasional alcoholic beverage perhaps once to twice weekly.  Desires to be full code.    Allergies  Allergies   Allergen Reactions   • Codeine Rash     RASH   • Penicillins Rash     rash       Medications  Prior to Admission Medications   Prescriptions Last Dose Informant Patient Reported? Taking?   Ascorbic Acid  (VITAMIN C PO) 9/20/2020 at Unknown time  Yes Yes   Sig: Take 3 Tabs by mouth every day.   VITAMIN D PO 9/20/2020 at Unknown time  Yes Yes   Sig: Take 2 Caps by mouth every day.   benzonatate (TESSALON) 200 MG capsule 9/19/2020 at Unknown time  Yes Yes   Sig: Take 200 mg by mouth 3 times a day as needed for Cough.      Facility-Administered Medications: None       Physical Exam  Temp:  [37.4 °C (99.3 °F)] 37.4 °C (99.3 °F)  Pulse:  [88-97] 93  Resp:  [20-36] 36  BP: (140-156)/() 156/92  SpO2:  [91 %-97 %] 97 %    General: No acute distress  HEENT atraumatic, normocephalic, pupils equal round reactive to light  Chest: Respirations are unlabored  Cardiac: Physiologic S1 and S2  Abdomen: Soft, nontender, nondistended  Extremities: Without clubbing, cyanosis or edema  Neuro: Cranial nerves II through XII are grossly intact.      Laboratory:  Recent Labs     09/20/20 1900   WBC 7.1   RBC 5.26   HEMOGLOBIN 15.6   HEMATOCRIT 45.1   MCV 85.7   MCH 29.7   MCHC 34.6   RDW 38.5   PLATELETCT 255   MPV 10.0     Recent Labs     09/20/20 1900   SODIUM 135   POTASSIUM 3.4*   CHLORIDE 97   CO2 21   GLUCOSE 264*   BUN 13   CREATININE 0.70   CALCIUM 9.0     Recent Labs     09/20/20 1900   ALTSGPT 21   ASTSGOT 28   ALKPHOSPHAT 66   TBILIRUBIN 0.5   GLUCOSE 264*     Recent Labs     09/20/20 2040   APTT 27.7   INR 1.08     No results for input(s): NTPROBNP in the last 72 hours.      No results for input(s): TROPONINT in the last 72 hours.    Imaging:  DX-CHEST-PORTABLE (1 VIEW)   Final Result      Interstitial and patchy peripheral airspace opacities raises concern for pneumonitis and Covid 19. Recommend clinical correlation. No pleural effusion.            Assessment/Plan:  I anticipate this patient will require at least two midnights for appropriate medical management, necessitating inpatient admission.    Acute respiratory disease due to 2019 novel coronavirus  Assessment & Plan  1. Patient will be admitted for supplemental  oxygenation.    2. I discussed the benefits of proning with the patient, will do this as tolerated to a goal of 4 hours twice daily.    3. D-dimer is only slightly elevated, would not start full dose anticoagulation, prophylaxis dose enoxaparin has been initiated.    4. Patient does not appear hypovolemic at this time will avoid IV fluids.     5. Given his symptom onset greater than 7 days ago, and his new requirement of 3 L nasal cannula to maintain saturation greater than 90%, he would appear to be a candidate for dexamethasone therapy.  This is per the most recent [facility] clinical treatment guidelines updated 8/6/2020.    6. Patient did have questions about the potential use of hydroxychloroquine, at the conclusion of my exam he seemed satisfied with foregoing the use of this medication based on my explanation about lack of evidence for benefit at this juncture.    DM (diabetes mellitus) (HCC)  Assessment & Plan  Patient will be provided with basal, prandial, correctional insulin per my orders.  He notes a history of elevated A1c, this will be rechecked.  However his glucose greater than 200 on admission labs is certainly consistent with diabetes.  The basal and prandial doses of insulin have been down titrated from their standard, I suspect he may be quite susceptible to the effects of insulin as he is naïve to their effects, these will be titrated daily to a goal glucose of .

## 2020-09-21 NOTE — ASSESSMENT & PLAN NOTE
"\"1. Patient will be admitted for supplemental oxygenation.    2. I discussed the benefits of proning with the patient, will do this as tolerated to a goal of 4 hours twice daily.    3. D-dimer is only slightly elevated, would not start full dose anticoagulation, prophylaxis dose enoxaparin has been initiated.    4. Patient does not appear hypovolemic at this time will avoid IV fluids.     5. Given his symptom onset greater than 7 days ago, and his new requirement of 3 L nasal cannula to maintain saturation greater than 90%, he would appear to be a candidate for dexamethasone therapy.  This is per the most recent [facility] clinical treatment guidelines updated 8/6/2020.    Close monitoring of clinical parameters, laboratory parameters, if worsening oxygenation to consider Remdesivir\"  Titrate O2. If worse then ID consult for remdesivir  Ddimer<1. Recheck robert if worsening O2.  Procalc low so no bacterial pneumonia  Add low dose Lasix to help improve hypoxia.  Improved. Discharge on O2, lasix, dexa and diabetes supplies.  "

## 2020-09-21 NOTE — PROGRESS NOTES
2 RN Skin Check    2 RN skin check complete.   Devices in place: NC.  Skin assessed under devices: YES  Confirmed pressure ulcers found on: N/A  New potential pressure ulcers noted on N/A. Wound consult placed No.  The following interventions in place None

## 2020-09-22 LAB
ANION GAP SERPL CALC-SCNC: 13 MMOL/L (ref 7–16)
BUN SERPL-MCNC: 15 MG/DL (ref 8–22)
CALCIUM SERPL-MCNC: 9.2 MG/DL (ref 8.5–10.5)
CHLORIDE SERPL-SCNC: 99 MMOL/L (ref 96–112)
CO2 SERPL-SCNC: 25 MMOL/L (ref 20–33)
CREAT SERPL-MCNC: 0.72 MG/DL (ref 0.5–1.4)
CRP SERPL HS-MCNC: 6.54 MG/DL (ref 0–0.75)
D DIMER PPP IA.FEU-MCNC: 0.41 UG/ML (FEU) (ref 0–0.5)
ERYTHROCYTE [DISTWIDTH] IN BLOOD BY AUTOMATED COUNT: 38.8 FL (ref 35.9–50)
FERRITIN SERPL-MCNC: 1369 NG/ML (ref 22–322)
GLUCOSE BLD-MCNC: 250 MG/DL (ref 65–99)
GLUCOSE BLD-MCNC: 324 MG/DL (ref 65–99)
GLUCOSE BLD-MCNC: 358 MG/DL (ref 65–99)
GLUCOSE BLD-MCNC: 384 MG/DL (ref 65–99)
GLUCOSE SERPL-MCNC: 228 MG/DL (ref 65–99)
HCT VFR BLD AUTO: 42.4 % (ref 42–52)
HGB BLD-MCNC: 14.4 G/DL (ref 14–18)
LDH SERPL L TO P-CCNC: 303 U/L (ref 107–266)
MAGNESIUM SERPL-MCNC: 2 MG/DL (ref 1.5–2.5)
MCH RBC QN AUTO: 29.5 PG (ref 27–33)
MCHC RBC AUTO-ENTMCNC: 34 G/DL (ref 33.7–35.3)
MCV RBC AUTO: 86.9 FL (ref 81.4–97.8)
PHOSPHATE SERPL-MCNC: 3.5 MG/DL (ref 2.5–4.5)
PLATELET # BLD AUTO: 276 K/UL (ref 164–446)
PMV BLD AUTO: 10.2 FL (ref 9–12.9)
POTASSIUM SERPL-SCNC: 3.8 MMOL/L (ref 3.6–5.5)
PROCALCITONIN SERPL-MCNC: <0.05 NG/ML
RBC # BLD AUTO: 4.88 M/UL (ref 4.7–6.1)
SODIUM SERPL-SCNC: 137 MMOL/L (ref 135–145)
WBC # BLD AUTO: 6.5 K/UL (ref 4.8–10.8)

## 2020-09-22 PROCEDURE — 84100 ASSAY OF PHOSPHORUS: CPT

## 2020-09-22 PROCEDURE — 83615 LACTATE (LD) (LDH) ENZYME: CPT

## 2020-09-22 PROCEDURE — 82962 GLUCOSE BLOOD TEST: CPT | Mod: 91

## 2020-09-22 PROCEDURE — 700102 HCHG RX REV CODE 250 W/ 637 OVERRIDE(OP): Performed by: HOSPITALIST

## 2020-09-22 PROCEDURE — 80048 BASIC METABOLIC PNL TOTAL CA: CPT

## 2020-09-22 PROCEDURE — 99233 SBSQ HOSP IP/OBS HIGH 50: CPT | Performed by: INTERNAL MEDICINE

## 2020-09-22 PROCEDURE — 700111 HCHG RX REV CODE 636 W/ 250 OVERRIDE (IP): Performed by: HOSPITALIST

## 2020-09-22 PROCEDURE — A9270 NON-COVERED ITEM OR SERVICE: HCPCS | Performed by: NURSE PRACTITIONER

## 2020-09-22 PROCEDURE — 36415 COLL VENOUS BLD VENIPUNCTURE: CPT

## 2020-09-22 PROCEDURE — A9270 NON-COVERED ITEM OR SERVICE: HCPCS | Performed by: HOSPITALIST

## 2020-09-22 PROCEDURE — 84145 PROCALCITONIN (PCT): CPT

## 2020-09-22 PROCEDURE — 83735 ASSAY OF MAGNESIUM: CPT

## 2020-09-22 PROCEDURE — 82728 ASSAY OF FERRITIN: CPT

## 2020-09-22 PROCEDURE — 700102 HCHG RX REV CODE 250 W/ 637 OVERRIDE(OP): Performed by: INTERNAL MEDICINE

## 2020-09-22 PROCEDURE — 85027 COMPLETE CBC AUTOMATED: CPT

## 2020-09-22 PROCEDURE — 770021 HCHG ROOM/CARE - ISO PRIVATE

## 2020-09-22 PROCEDURE — 85379 FIBRIN DEGRADATION QUANT: CPT

## 2020-09-22 PROCEDURE — 700102 HCHG RX REV CODE 250 W/ 637 OVERRIDE(OP): Performed by: NURSE PRACTITIONER

## 2020-09-22 PROCEDURE — 86140 C-REACTIVE PROTEIN: CPT

## 2020-09-22 RX ORDER — FUROSEMIDE 20 MG/1
20 TABLET ORAL
Status: DISCONTINUED | OUTPATIENT
Start: 2020-09-23 | End: 2020-09-24 | Stop reason: HOSPADM

## 2020-09-22 RX ORDER — INSULIN GLARGINE 100 [IU]/ML
10 INJECTION, SOLUTION SUBCUTANEOUS
Status: DISCONTINUED | OUTPATIENT
Start: 2020-09-23 | End: 2020-09-24 | Stop reason: HOSPADM

## 2020-09-22 RX ORDER — INSULIN GLARGINE 100 [IU]/ML
30 INJECTION, SOLUTION SUBCUTANEOUS EVERY EVENING
Status: DISCONTINUED | OUTPATIENT
Start: 2020-09-23 | End: 2020-09-24 | Stop reason: HOSPADM

## 2020-09-22 RX ORDER — POTASSIUM CHLORIDE 20 MEQ/1
20 TABLET, EXTENDED RELEASE ORAL DAILY
Status: DISCONTINUED | OUTPATIENT
Start: 2020-09-23 | End: 2020-09-24 | Stop reason: HOSPADM

## 2020-09-22 RX ORDER — DEXTROSE MONOHYDRATE 25 G/50ML
50 INJECTION, SOLUTION INTRAVENOUS
Status: DISCONTINUED | OUTPATIENT
Start: 2020-09-22 | End: 2020-09-24 | Stop reason: HOSPADM

## 2020-09-22 RX ADMIN — DEXAMETHASONE 6 MG: 6 TABLET ORAL at 05:27

## 2020-09-22 RX ADMIN — ENOXAPARIN SODIUM 40 MG: 40 INJECTION SUBCUTANEOUS at 05:27

## 2020-09-22 RX ADMIN — BENZOCAINE AND MENTHOL 1 LOZENGE: 15; 3.6 LOZENGE ORAL at 08:02

## 2020-09-22 RX ADMIN — INSULIN GLARGINE 20 UNITS: 100 INJECTION, SOLUTION SUBCUTANEOUS at 16:38

## 2020-09-22 ASSESSMENT — ENCOUNTER SYMPTOMS
MUSCULOSKELETAL NEGATIVE: 1
NEUROLOGICAL NEGATIVE: 1
CARDIOVASCULAR NEGATIVE: 1
SPUTUM PRODUCTION: 0
NERVOUS/ANXIOUS: 1
CHILLS: 0
SHORTNESS OF BREATH: 1
EYES NEGATIVE: 1
FEVER: 0
GASTROINTESTINAL NEGATIVE: 1
COUGH: 1

## 2020-09-22 NOTE — PROGRESS NOTES
"[facility] Medicine Daily Progress Note    Date of Service  9/22/2020    Chief Complaint  60 y.o. male admitted 9/20/2020 with history of diabetes with poor control, had symptoms approximately a week ago and returned positive as an outpatient last Friday, he checked his saturations at home and then became hypoxic triggering him to return to the hospital, the patient placed on 4 L of oxygen, admitted for COVID pneumonia.    Hospital Course    60-year-old diabetic with COVID-19 pneumonia      Interval Problem Update  \"Patient seen and examined today.    Patient tolerating treatment and therapies.  All Data, Medication data reviewed.  Case discussed with nursing as available.  Plan of Care reviewed with patient and notified of changes.  9/21 the patient feels okay, he denies fevers, no nausea vomiting, mild dry cough, still requiring oxygen.\"  Still requiring O2.    Consultants/Specialty  None    Code Status  Full Code    Disposition  Observe O2 need 9/23  Diabetes evaluation.      Review of Systems  Review of Systems   Constitutional: Positive for malaise/fatigue. Negative for chills and fever.   HENT: Negative.    Eyes: Negative.    Respiratory: Positive for cough and shortness of breath. Negative for sputum production.    Cardiovascular: Negative.    Gastrointestinal: Negative.    Genitourinary: Negative.    Musculoskeletal: Negative.    Skin: Negative.    Neurological: Negative.    Endo/Heme/Allergies: Negative.    Psychiatric/Behavioral: The patient is nervous/anxious.    All other systems reviewed and are negative.       Physical Exam  Temp:  [35.8 °C (96.4 °F)-36.6 °C (97.8 °F)] 35.8 °C (96.4 °F)  Pulse:  [66-81] 80  Resp:  [17-18] 18  BP: (109-121)/(60-75) 120/75  SpO2:  [93 %-95 %] 93 %    Physical Exam  Vitals signs and nursing note reviewed.   Constitutional:       Appearance: He is well-developed.      Comments: Pt seen and examined.   HENT:      Head: Normocephalic and atraumatic.   Eyes:      Pupils: Pupils " "are equal, round, and reactive to light.   Neck:      Musculoskeletal: Normal range of motion and neck supple.   Cardiovascular:      Rate and Rhythm: Normal rate.      Heart sounds: Normal heart sounds.   Pulmonary:      Effort: Pulmonary effort is normal.      Breath sounds: Rhonchi and rales present.   Abdominal:      General: Bowel sounds are normal.      Palpations: Abdomen is soft.   Genitourinary:     Penis: Normal.    Musculoskeletal: Normal range of motion.   Skin:     General: Skin is warm and dry.   Neurological:      Mental Status: He is alert and oriented to person, place, and time.      Motor: Weakness present.   Psychiatric:         Behavior: Behavior normal.         Fluids    Intake/Output Summary (Last 24 hours) at 9/22/2020 1142  Last data filed at 9/21/2020 1300  Gross per 24 hour   Intake 240 ml   Output --   Net 240 ml       Laboratory  Recent Labs     09/20/20  1900 09/21/20  0036 09/22/20  0509   WBC 7.1 7.3 6.5   RBC 5.26 4.86 4.88   HEMOGLOBIN 15.6 14.1 14.4   HEMATOCRIT 45.1 42.2 42.4   MCV 85.7 86.8 86.9   MCH 29.7 29.0 29.5   MCHC 34.6 33.4* 34.0   RDW 38.5 39.0 38.8   PLATELETCT 255 243 276   MPV 10.0 10.4 10.2     Recent Labs     09/20/20  1900 09/21/20  0036 09/22/20  0509   SODIUM 135 137 137   POTASSIUM 3.4* 3.8 3.8   CHLORIDE 97 99 99   CO2 21 25 25   GLUCOSE 264* 320* 228*   BUN 13 14 15   CREATININE 0.70 0.81 0.72   CALCIUM 9.0 8.7 9.2     Recent Labs     09/20/20  2040   APTT 27.7   INR 1.08               Imaging  DX-CHEST-PORTABLE (1 VIEW)   Final Result      Interstitial and patchy peripheral airspace opacities raises concern for pneumonitis and Covid 19. Recommend clinical correlation. No pleural effusion.           Assessment/Plan  * Acute respiratory disease due to 2019 novel coronavirus, diabetes- (present on admission)  Assessment & Plan  \"1. Patient will be admitted for supplemental oxygenation.    2. I discussed the benefits of proning with the patient, will do this as " "tolerated to a goal of 4 hours twice daily.    3. D-dimer is only slightly elevated, would not start full dose anticoagulation, prophylaxis dose enoxaparin has been initiated.    4. Patient does not appear hypovolemic at this time will avoid IV fluids.     5. Given his symptom onset greater than 7 days ago, and his new requirement of 3 L nasal cannula to maintain saturation greater than 90%, he would appear to be a candidate for dexamethasone therapy.  This is per the most recent [facility] clinical treatment guidelines updated 8/6/2020.    Close monitoring of clinical parameters, laboratory parameters, if worsening oxygenation to consider Remdesivir\"  Titrate O2. If worse then ID consult for remdesivir  Ddimer<1. Recheck robert if worsening O2.  Procalc low so no bacterial pneumonia      Pneumonia due to COVID-19 virus- (present on admission)  Assessment & Plan  As above, close monitoring    DM (diabetes mellitus) (HCC)- (present on admission)  Assessment & Plan  \"Patient with prior poor control  Continue with insulin regimen, adjust as indicated\"  May need insulin       VTE prophylaxis: Lovenox SQ  Gastrointestinal prophylaxis: None  Antibiotics:   Ordered Anti-infectives (9999h ago, onward)    None        Diet:   Orders Placed This Encounter   Procedures   • Diet Order Diabetic     Standing Status:   Standing     Number of Occurrences:   1     Order Specific Question:   Diet:     Answer:   Diabetic [3]      Prognosis: Guarded  Risk: The Patient is at HIGH risk for inpatient complications and decompensation secondary to his multiple cormorbidities  listed above, especially   Problem   Acute respiratory disease due to 2019 novel coronavirus, diabetes      I have personally reviewed notes, labs, vitals, imaging.  I discussed the plan of care with bedside RN as well as on multidisciplinary rounds  I have performed a physical exam and reviewed and updated ROS and Plan today 9/22/2020. In review of yesterday's note 9/21/2020 "   there are no changes except as documented above.

## 2020-09-22 NOTE — DIETARY
Nutrition Services - Poor po and/or weight loss reported on admission. Malnutrition Screening Tool score <2. Nutrition assessment not indicated at this time.     Increased malnutrition risk due to Covid 19. PO intake % x2 documented meals.    RD will monitor per department guidelines. Please consult RD for nutrition concerns.

## 2020-09-22 NOTE — CARE PLAN
Problem: Safety  Goal: Will remain free from injury  Note: Call light and belongings within reach, bed down and locked, hourly rounding in progress. Treaded socks in use, no DME at bedside.  pt calls appropriately.       Problem: Infection  Goal: Will remain free from infection  Note: Droplet, contact and eye precautions

## 2020-09-22 NOTE — CARE PLAN
Problem: Knowledge Deficit  Goal: Knowledge of disease process/condition, treatment plan, diagnostic tests, and medications will improve  Outcome: PROGRESSING AS EXPECTED  Note: Patient updated on plan of care, verbalized understanding     Problem: Respiratory:  Goal: Respiratory status will improve  Outcome: PROGRESSING AS EXPECTED  Note: O2 bumped up to 4L NC this AM after patient ambulated up to chair, will wean down as appropriate

## 2020-09-23 LAB
APPEARANCE UR: CLEAR
BACTERIA #/AREA URNS HPF: NEGATIVE /HPF
BILIRUB UR QL STRIP.AUTO: NEGATIVE
COLOR UR: YELLOW
EPI CELLS #/AREA URNS HPF: NEGATIVE /HPF
GLUCOSE BLD-MCNC: 208 MG/DL (ref 65–99)
GLUCOSE BLD-MCNC: 231 MG/DL (ref 65–99)
GLUCOSE BLD-MCNC: 282 MG/DL (ref 65–99)
GLUCOSE BLD-MCNC: 291 MG/DL (ref 65–99)
GLUCOSE UR STRIP.AUTO-MCNC: NEGATIVE MG/DL
HYALINE CASTS #/AREA URNS LPF: ABNORMAL /LPF
IL6 SERPL-MCNC: 11.5 PG/ML
KETONES UR STRIP.AUTO-MCNC: NEGATIVE MG/DL
LEUKOCYTE ESTERASE UR QL STRIP.AUTO: NEGATIVE
MICRO URNS: ABNORMAL
NITRITE UR QL STRIP.AUTO: NEGATIVE
PH UR STRIP.AUTO: 5.5 [PH] (ref 5–8)
PROT UR QL STRIP: 30 MG/DL
RBC # URNS HPF: ABNORMAL /HPF
RBC UR QL AUTO: NEGATIVE
SP GR UR STRIP.AUTO: 1.02
UROBILINOGEN UR STRIP.AUTO-MCNC: 0.2 MG/DL
WBC #/AREA URNS HPF: ABNORMAL /HPF

## 2020-09-23 PROCEDURE — 700102 HCHG RX REV CODE 250 W/ 637 OVERRIDE(OP): Performed by: INTERNAL MEDICINE

## 2020-09-23 PROCEDURE — 82962 GLUCOSE BLOOD TEST: CPT | Mod: 91

## 2020-09-23 PROCEDURE — 700102 HCHG RX REV CODE 250 W/ 637 OVERRIDE(OP): Performed by: HOSPITALIST

## 2020-09-23 PROCEDURE — A9270 NON-COVERED ITEM OR SERVICE: HCPCS | Performed by: INTERNAL MEDICINE

## 2020-09-23 PROCEDURE — 99232 SBSQ HOSP IP/OBS MODERATE 35: CPT | Performed by: INTERNAL MEDICINE

## 2020-09-23 PROCEDURE — 81001 URINALYSIS AUTO W/SCOPE: CPT

## 2020-09-23 PROCEDURE — A9270 NON-COVERED ITEM OR SERVICE: HCPCS | Performed by: HOSPITALIST

## 2020-09-23 PROCEDURE — 700111 HCHG RX REV CODE 636 W/ 250 OVERRIDE (IP): Performed by: HOSPITALIST

## 2020-09-23 PROCEDURE — 770021 HCHG ROOM/CARE - ISO PRIVATE

## 2020-09-23 RX ORDER — LISINOPRIL 2.5 MG/1
2.5 TABLET ORAL DAILY
Qty: 30 TAB | Refills: 0 | Status: SHIPPED | OUTPATIENT
Start: 2020-09-23 | End: 2020-09-24 | Stop reason: SDUPTHER

## 2020-09-23 RX ORDER — POLYETHYLENE GLYCOL 3350 17 G/17G
POWDER, FOR SOLUTION ORAL
Refills: 3 | COMMUNITY
Start: 2020-09-23

## 2020-09-23 RX ORDER — LISINOPRIL 5 MG/1
2.5 TABLET ORAL
Status: DISCONTINUED | OUTPATIENT
Start: 2020-09-23 | End: 2020-09-24 | Stop reason: HOSPADM

## 2020-09-23 RX ORDER — POTASSIUM CHLORIDE 20 MEQ/1
20 TABLET, EXTENDED RELEASE ORAL DAILY
Qty: 7 TAB | Refills: 0 | Status: SHIPPED | OUTPATIENT
Start: 2020-09-24 | End: 2020-09-24 | Stop reason: SDUPTHER

## 2020-09-23 RX ORDER — FUROSEMIDE 20 MG/1
20 TABLET ORAL DAILY
Qty: 7 TAB | Refills: 0 | Status: SHIPPED | OUTPATIENT
Start: 2020-09-24 | End: 2020-09-24 | Stop reason: SDUPTHER

## 2020-09-23 RX ORDER — GLUCOSAMINE HCL/CHONDROITIN SU 500-400 MG
CAPSULE ORAL
Qty: 100 EACH | Refills: 0 | Status: SHIPPED | OUTPATIENT
Start: 2020-09-23 | End: 2020-09-24 | Stop reason: SDUPTHER

## 2020-09-23 RX ORDER — INSULIN GLARGINE 100 [IU]/ML
15 INJECTION, SOLUTION SUBCUTANEOUS EVERY MORNING
Qty: 10 ML | Refills: 3 | Status: SHIPPED | OUTPATIENT
Start: 2020-09-24 | End: 2020-09-24 | Stop reason: SDUPTHER

## 2020-09-23 RX ORDER — DEXAMETHASONE 6 MG/1
6 TABLET ORAL DAILY
Qty: 6 TAB | Refills: 0 | Status: SHIPPED | OUTPATIENT
Start: 2020-09-24 | End: 2020-09-24 | Stop reason: SDUPTHER

## 2020-09-23 RX ORDER — INSULIN GLARGINE 100 [IU]/ML
30 INJECTION, SOLUTION SUBCUTANEOUS EVERY EVENING
Qty: 10 ML | Refills: 3 | Status: SHIPPED | OUTPATIENT
Start: 2020-09-23 | End: 2020-09-24 | Stop reason: SDUPTHER

## 2020-09-23 RX ORDER — AMOXICILLIN 250 MG
2 CAPSULE ORAL 2 TIMES DAILY
Qty: 30 TAB | Refills: 0 | COMMUNITY
Start: 2020-09-23

## 2020-09-23 RX ORDER — ACETAMINOPHEN 325 MG/1
650 TABLET ORAL EVERY 6 HOURS PRN
Qty: 30 TAB | Refills: 0 | COMMUNITY
Start: 2020-09-23

## 2020-09-23 RX ORDER — LANCETS 30 GAUGE
EACH MISCELLANEOUS
Qty: 100 EACH | Refills: 0 | Status: SHIPPED | OUTPATIENT
Start: 2020-09-23 | End: 2020-09-24 | Stop reason: SDUPTHER

## 2020-09-23 RX ADMIN — LISINOPRIL 2.5 MG: 5 TABLET ORAL at 16:42

## 2020-09-23 RX ADMIN — FUROSEMIDE 20 MG: 20 TABLET ORAL at 05:58

## 2020-09-23 RX ADMIN — ENOXAPARIN SODIUM 40 MG: 40 INJECTION SUBCUTANEOUS at 05:58

## 2020-09-23 RX ADMIN — METFORMIN HYDROCHLORIDE 500 MG: 500 TABLET ORAL at 08:16

## 2020-09-23 RX ADMIN — POTASSIUM CHLORIDE 20 MEQ: 1500 TABLET, EXTENDED RELEASE ORAL at 05:58

## 2020-09-23 RX ADMIN — INSULIN GLARGINE 30 UNITS: 100 INJECTION, SOLUTION SUBCUTANEOUS at 16:48

## 2020-09-23 RX ADMIN — INSULIN GLARGINE 10 UNITS: 100 INJECTION, SOLUTION SUBCUTANEOUS at 08:18

## 2020-09-23 RX ADMIN — INSULIN LISPRO 3 UNITS: 100 INJECTION, SOLUTION INTRAVENOUS; SUBCUTANEOUS at 08:19

## 2020-09-23 RX ADMIN — INSULIN LISPRO 3 UNITS: 100 INJECTION, SOLUTION INTRAVENOUS; SUBCUTANEOUS at 12:02

## 2020-09-23 RX ADMIN — DEXAMETHASONE 6 MG: 6 TABLET ORAL at 05:58

## 2020-09-23 RX ADMIN — INSULIN LISPRO 3 UNITS: 100 INJECTION, SOLUTION INTRAVENOUS; SUBCUTANEOUS at 16:47

## 2020-09-23 RX ADMIN — METFORMIN HYDROCHLORIDE 500 MG: 500 TABLET ORAL at 16:42

## 2020-09-23 NOTE — DISCHARGE PLANNING
Anticipated Discharge Disposition:   Home  DME:  Home oxygen (self pay)  Meds to Beds  ? Insulin    Action:    Pt admitted with Covid-19 pneumonia, diabetes (poorly controlled).  Receiving O2 3LNC.    Home oxygen study completed and home oxygen indicated.    RN TI spoke to patient via telephone yesterday afternoon. He stated he lives near [facility] in an apartment.  He purchased a new home and escrow was suppose to close yesterday.  He has a new job and expects his insurance to start next week.  Discussed work excuse letter and FMLA and he stated he did not need these.  He is independent with ADLs and IADLs.  He stated he is able to pay for home oxygen out-of-pocket and medications if not too expensive.    Pt stated he has not been treated in the past for diabetes.  ? Diabetes educator.    Barriers to Discharge:    Medical clearance    Plan:    Obtain choices for DME.  Assist with obtaining medications as needed.    Care Transition Team Assessment    Information Source  Orientation : Oriented x 4  Information Given By: Patient  Who is responsible for making decisions for patient? : Patient    Readmission Evaluation  Is this a readmission?: No    Elopement Risk  Legal Hold: No  Ambulatory or Self Mobile in Wheelchair: No-Not an Elopement Risk  Elopement Risk: Not at Risk for Elopement    Interdisciplinary Discharge Planning  Patient or legal guardian wants to designate a caregiver: Yes  Caregiver name: Shanice Sykes  Caregiver contact info: [phone]    Discharge Preparedness  What is your plan after discharge?: Home with help  Prior Functional Level: Ambulatory, Drives Self, Independent with Activities of Daily Living, Independent with Medication Management  Difficulity with ADLs: None  Difficulity with IADLs: None    Functional Assesment  Prior Functional Level: Ambulatory, Drives Self, Independent with Activities of Daily Living, Independent with Medication Management    Finances  Financial Barriers to Discharge:  No  Prescription Coverage: No    Vision / Hearing Impairment  Vision Impairment : No  Hearing Impairment : No         Advance Directive  Advance Directive?: None    Domestic Abuse  Have you ever been the victim of abuse or violence?: No  Physical Abuse or Sexual Abuse: No  Verbal Abuse or Emotional Abuse: No  Possible Abuse/Neglect Reported to:: Not Applicable         Discharge Risks or Barriers  Discharge risks or barriers?: No PCP, Uninsured / underinsured  Patient risk factors: No PCP, Uninsured or underinsured    Anticipated Discharge Information  Discharge Disposition: Discharged to home/self care (01)  Discharge Address: [address] [place] [address]  Discharge Contact Phone Number: [phone]

## 2020-09-23 NOTE — DISCHARGE PLANNING
Received Choice form at 1145  Agency/Facility Name: [facility]  Referral sent per Choice form @ [phone]  [facility] sent modified order for O2 for 90 days.

## 2020-09-23 NOTE — CARE PLAN
Problem: Communication  Goal: The ability to communicate needs accurately and effectively will improve  Outcome: PROGRESSING AS EXPECTED  Note: Patient communicates needs effectively and appropriately     Problem: Safety  Goal: Will remain free from injury  Outcome: PROGRESSING AS EXPECTED     Problem: Knowledge Deficit  Goal: Knowledge of disease process/condition, treatment plan, diagnostic tests, and medications will improve  Outcome: PROGRESSING AS EXPECTED  Note: Patient updated on plan of care, verbalized understanding     Problem: Respiratory:  Goal: Respiratory status will improve  Outcome: PROGRESSING AS EXPECTED

## 2020-09-23 NOTE — FACE TO FACE
Face to Face Note  -  Durable Medical Equipment    Elroy Kilgore M.D. - NPI: [id number]  I certify that this patient is under my care and that they had a durable medical equipment(DME)face to face encounter by myself that meets the physician DME face-to-face encounter requirements with this patient on:    Date of encounter:   Patient:                    MRN:                       YOB: 2022  Romel Sykes  [id number]  1960     The encounter with the patient was in whole, or in part, for the following medical condition, which is the primary reason for durable medical equipment:  Other - CoVID pneumonia    I certify that, based on my findings, the following durable medical equipment is medically necessary:  Oxygen.    HOME O2 Saturation Measurements:(Values must be present for Home Oxygen orders)  Room air sat at rest: 90  Room air sat with amb: 86  With liters of O2: 3, O2 sat at rest with O2: 93  With Liters of O2: 3, O2 sat with amb with O2 : 89  Is the patient mobile?: Yes    My Clinical findings support the need for the above equipment due to:  Other - as above     Supporting Symptoms: hypoxia

## 2020-09-23 NOTE — DIETARY
Nutrition Services:   Consult received for diabetes diet education. Per current department guidelines dietary staff not permitted to enter COVID+ isolation rooms at this time. Left diabetes booklet with RN. RN to provide to pt. Call pt via phone. Pt states he read over the information and had no questions for me. Discussed diabetes diet. Pt interested and understood the information.     RD available prn.

## 2020-09-23 NOTE — DISCHARGE SUMMARY
Discharge Summary    CHIEF COMPLAINT ON ADMISSION  No chief complaint on file.      Reason for Admission  Cough     Admission Date  9/20/2020    CODE STATUS  Full Code    HPI & HOSPITAL COURSE  This is a 60 y.o. male here with cough, dyspnea, nausea, whole body aches, diarrhea and headache  Please review Dr. Dashawn Sauer M.D. notes for further details of history of present illness, past medical/social/family histories, allergies and medications.   History of poorly controlled diabetes reportedly on oral hypoglycemics.  Recently moved to [place].  Presented with  cough, dyspnea, nausea, whole body aches, diarrhea and headache.  Son came to visit 10-14d ago, son's roommate known CoVIOD positive, son himself wasn't tested. Patient started having above symptoms and tested CoVID POSITIVE 8d prior to admission. He was sent home but his symptoms have worsened prompting him to come in the ED.  At the ED, he is afebrile, slightly hypertensive, somewhat HYPOXIC and out on O2.  CXR:  Interstitial and patchy peripheral airspace opacities raises concern for pneumonitis and Covid 19. Recommend clinical correlation. No pleural effusion.  No leukocytosis. Hyperglycemic.  CoVID POSITIVE  Ddimer<1  Procalc negative.  Decadron started  He improved. O2 titrated down but he will require O2 going home. This is provided.He will also complete Decadron and be on Lasix for 7d or so.    His A1c pankaj back 11. He was seen by diabetes teaching and agustin require SS insulin along with metformin as he will complete steroid course. Diabetes educator came and he will be discharged on SS inulin and DM supplies.    Patient DECLINED [facility] when offered.    At discharge date, Romel Sykes afebrile and hemodynamically stable.  Romel Sykes wanted to be discharged today.    Discharge Physical Exam  Vitals signs and nursing note reviewed.   Constitutional:       Appearance: He is well-developed.      Comments: Pt seen and examined.   HENT:       Head: Normocephalic and atraumatic.   Eyes:      Pupils: Pupils are equal, round, and reactive to light.   Neck:      Musculoskeletal: Normal range of motion and neck supple.   Cardiovascular:      Rate and Rhythm: Normal rate.      Heart sounds: Normal heart sounds.   Pulmonary:      Effort: Pulmonary effort is normal.      Breath sounds: Rhonchi and rales present.   Abdominal:      General: Bowel sounds are normal.      Palpations: Abdomen is soft.   Genitourinary:     Penis: Normal.    Musculoskeletal: Normal range of motion.   Skin:     General: Skin is warm and dry.   Neurological:      Mental Status: He is alert and oriented to person, place, and time.      Motor: Weakness present.   Psychiatric:        Imaging  DX-CHEST-PORTABLE (1 VIEW)   Final Result      Interstitial and patchy peripheral airspace opacities raises concern for pneumonitis and Covid 19. Recommend clinical correlation. No pleural effusion.              Therefore, he is discharged in fair and stable condition to home with close outpatient follow-up.    The patient met 2-midnight criteria for an inpatient stay at the time of discharge.    Discharge Date  9/23/2020    FOLLOW UP ITEMS POST DISCHARGE      DISCHARGE DIAGNOSES  Principal Problem:    Acute respiratory disease due to 2019 novel coronavirus, diabetes POA: Yes  Active Problems:    DM (diabetes mellitus) (HCC) POA: Yes    Pneumonia due to COVID-19 virus POA: Yes        FOLLOW UP  No future appointments.  No follow-up provider specified.  Assign and follow up with primary provider or discharge clinic physician in 1 week. Advise Romel Sykes to check blood glucoses at home and have a log for primary provider to review. If he is started on ACEI will need labs to be checked at that visit for Cr- and K+ (also on Lasix for a week)  Return to ER in the event of new or worsening symptoms. Please note importance of compliance and the patient has agreed to proceed with all medical  recommendations and follow up plan indicated above. All medications come with benefits and risks. Risks may include permanent injury or death and these risks can be minimized with close reassessment and monitoring. Please make it to your scheduled follow ups with primary provider and/or specialists clinic.  Given CoVID positive, quarantine if necessary, mask use, social distancing and proper hygiene/hand washing, follow instructions from resource materials provided. For work release letters, call [facility] (number provided by nursing instruction).        MEDICATIONS ON DISCHARGE     Medication List      START taking these medications      Instructions   acetaminophen 325 MG Tabs  Commonly known as: TYLENOL   Take 2 Tabs by mouth every 6 hours as needed (Mild Pain; (Pain scale 1-3); Temp greater than 100.5 F).  Dose: 650 mg     Alcohol Swabs   Doctor's comments: Per formulary preference. ICD-10 code: E11.65 Uncontrolled type 2 Diabetes Mellitus  Wipe site with prep pad prior to injection.     benzocaine-menthol 15-3.6 MG Lozg  Commonly known as: CEPACOL   Spray 1 Lozenge in mouth/throat every 2 hours as needed.  Dose: 1 Lozenge     * Blood Glucose Meter Kit   Doctor's comments: Or per formulary preference. ICD-10 code: E11.65 Uncontrolled type 2 Diabetes Mellitus  Test blood sugar as recommended by provider. True Metrix blood glucose monitoring kit.     * Blood Glucose Test Strips   Doctor's comments: Or per formulary preference. ICD-10 code: E11.65 Uncontrolled type 2 Diabetes Mellitus  Use one True Metrix strip to test blood sugar three times daily before meals     dexamethasone 6 MG Tabs  Start taking on: September 24, 2020  Commonly known as: DECADRON   Take 1 Tab by mouth every day for 6 days.  Dose: 6 mg     furosemide 20 MG Tabs  Start taking on: September 24, 2020  Commonly known as: LASIX   Take 1 Tab by mouth every day for 7 days.  Dose: 20 mg     glucose 4 g chewable tablet   Take 4 Tabs by  mouth as needed for Low Blood Sugar (If FSBG is less than or equal to 70 mg/dL and patient able to eat or drink).  Dose: 16 g     * insulin glargine 100 UNIT/ML Soln  Commonly known as: Lantus   Inject 30 Units as instructed every evening.  Dose: 30 Units     * insulin glargine 100 UNIT/ML Soln  Start taking on: September 24, 2020  Commonly known as: Lantus   Inject 15 Units as instructed every morning.  Dose: 15 Units     insulin lispro 100 UNIT/ML  Commonly known as: HumaLOG   For blood glucose<70 mg/dL snacks,=0u,151-200=2u,201-250=4u,251-300=6u,301-350=9u,351-400=12u,>400=15u callMD     Insulin Syringes U-100 0.5 mL   Doctor's comments: Use one syringe to inject insulin three times daily.  Use one syringe to inject insulin three times daily.     Lancets   Doctor's comments: Or per formulary preference. ICD-10 code: E11.65 Uncontrolled type 2 Diabetes Mellitus  Use one True Metrix lancet to test blood sugar three times daily before meals.     lisinopril 2.5 MG Tabs  Commonly known as: PRINIVIL   Take 1 Tab by mouth every day.  Dose: 2.5 mg     metFORMIN 500 MG Tabs  Commonly known as: GLUCOPHAGE   Take 1 Tab by mouth 2 times a day, with meals.  Dose: 500 mg     polyethylene glycol/lytes 17 g Pack  Commonly known as: MIRALAX   Take  by mouth 1 time daily as needed (if sennosides and docusate ineffective after 24 hours).     potassium chloride SA 20 MEQ Tbcr  Start taking on: September 24, 2020  Commonly known as: Gatitour   Doctor's comments: Stop if not taking lasix  Take 1 Tab by mouth every day for 7 days.  Dose: 20 mEq     senna-docusate 8.6-50 MG Tabs  Commonly known as: PERICOLACE or SENOKOT S   Take 2 Tabs by mouth 2 Times a Day.  Dose: 2 Tab         * This list has 4 medication(s) that are the same as other medications prescribed for you. Read the directions carefully, and ask your doctor or other care provider to review them with you.            CONTINUE taking these medications      Instructions    benzonatate 200 MG capsule  Commonly known as: TESSALON   Take 200 mg by mouth 3 times a day as needed for Cough.  Dose: 200 mg     VITAMIN C PO   Take 3 Tabs by mouth every day.  Dose: 3 Tab     VITAMIN D PO   Take 2 Caps by mouth every day.  Dose: 2 Cap            Allergies  Allergies   Allergen Reactions   • Codeine Rash     RASH   • Penicillins Rash     rash       DIET  Orders Placed This Encounter   Procedures   • Diet Order Diabetic     Standing Status:   Standing     Number of Occurrences:   1     Order Specific Question:   Diet:     Answer:   Diabetic [3]       ACTIVITY  No heavy lifting or strenuous activity    CONSULTATIONS      PROCEDURES  Dx-chest-portable (1 View)    Result Date: 9/20/2020 9/20/2020 7:33 PM HISTORY/REASON FOR EXAM:  Shortness of Breath. TECHNIQUE/EXAM DESCRIPTION AND NUMBER OF VIEWS: Single portable view of the chest. COMPARISON: None FINDINGS: Cardiomediastinal silhouette is normal. Low lung volumes. There are diffuse interstitial and peripheral patchy airspace opacities, nonspecific but suspicious for pneumonitis and possibly Covid 19. Recommend clinical correlation. No pleural effusion or pneumothorax. No acute osseous abnormality.     Interstitial and patchy peripheral airspace opacities raises concern for pneumonitis and Covid 19. Recommend clinical correlation. No pleural effusion.      LABORATORY  Lab Results   Component Value Date    SODIUM 137 09/22/2020    POTASSIUM 3.8 09/22/2020    CHLORIDE 99 09/22/2020    CO2 25 09/22/2020    GLUCOSE 228 (H) 09/22/2020    BUN 15 09/22/2020    CREATININE 0.72 09/22/2020        Lab Results   Component Value Date    WBC 6.5 09/22/2020    HEMOGLOBIN 14.4 09/22/2020    HEMATOCRIT 42.4 09/22/2020    PLATELETCT 276 09/22/2020        Total time of the discharge process exceeds 40 minutes.

## 2020-09-23 NOTE — CARE PLAN
Problem: Safety  Goal: Will remain free from injury  Note: Call light and belongings within reach, bed down and locked, hourly rounding in progress. Treaded socks in use, no DME at bedside. pt calls appropriately.       Problem: Infection  Goal: Will remain free from infection  Note: Droplet contact and eye precautions in place.

## 2020-09-24 ENCOUNTER — PHARMACY VISIT (OUTPATIENT)
Dept: PHARMACY | Facility: MEDICAL CENTER | Age: 60
End: 2020-09-24
Payer: COMMERCIAL

## 2020-09-24 VITALS
DIASTOLIC BLOOD PRESSURE: 84 MMHG | WEIGHT: 227.51 LBS | HEIGHT: 70 IN | TEMPERATURE: 97.7 F | HEART RATE: 82 BPM | OXYGEN SATURATION: 90 % | SYSTOLIC BLOOD PRESSURE: 130 MMHG | RESPIRATION RATE: 18 BRPM | BODY MASS INDEX: 32.57 KG/M2

## 2020-09-24 LAB
GLUCOSE BLD-MCNC: 129 MG/DL (ref 65–99)
GLUCOSE BLD-MCNC: 287 MG/DL (ref 65–99)

## 2020-09-24 PROCEDURE — 700102 HCHG RX REV CODE 250 W/ 637 OVERRIDE(OP): Performed by: INTERNAL MEDICINE

## 2020-09-24 PROCEDURE — 99239 HOSP IP/OBS DSCHRG MGMT >30: CPT | Performed by: INTERNAL MEDICINE

## 2020-09-24 PROCEDURE — 700102 HCHG RX REV CODE 250 W/ 637 OVERRIDE(OP): Performed by: HOSPITALIST

## 2020-09-24 PROCEDURE — 82962 GLUCOSE BLOOD TEST: CPT

## 2020-09-24 PROCEDURE — A9270 NON-COVERED ITEM OR SERVICE: HCPCS | Performed by: HOSPITALIST

## 2020-09-24 PROCEDURE — A9270 NON-COVERED ITEM OR SERVICE: HCPCS | Performed by: INTERNAL MEDICINE

## 2020-09-24 PROCEDURE — 700111 HCHG RX REV CODE 636 W/ 250 OVERRIDE (IP): Performed by: HOSPITALIST

## 2020-09-24 PROCEDURE — RXMED WILLOW AMBULATORY MEDICATION CHARGE: Performed by: INTERNAL MEDICINE

## 2020-09-24 RX ORDER — DEXAMETHASONE 2 MG/1
6 TABLET ORAL DAILY
Qty: 18 TAB | Refills: 0 | Status: SHIPPED | OUTPATIENT
Start: 2020-09-24 | End: 2020-09-30

## 2020-09-24 RX ORDER — INSULIN GLARGINE 100 [IU]/ML
30 INJECTION, SOLUTION SUBCUTANEOUS EVERY EVENING
Qty: 10 ML | Refills: 3 | Status: SHIPPED | OUTPATIENT
Start: 2020-09-24

## 2020-09-24 RX ORDER — LISINOPRIL 2.5 MG/1
2.5 TABLET ORAL DAILY
Qty: 30 TAB | Refills: 0 | Status: SHIPPED | OUTPATIENT
Start: 2020-09-24

## 2020-09-24 RX ORDER — CALCIUM CITRATE/VITAMIN D3 200MG-6.25
TABLET ORAL
Qty: 100 STRIP | Refills: 3 | OUTPATIENT
Start: 2020-09-24

## 2020-09-24 RX ORDER — FUROSEMIDE 20 MG/1
20 TABLET ORAL DAILY
Qty: 7 TAB | Refills: 0 | Status: SHIPPED | OUTPATIENT
Start: 2020-09-24 | End: 2020-10-01

## 2020-09-24 RX ORDER — SYRINGE-NEEDLE,INSULIN,0.5 ML 27GX1/2"
SYRINGE, EMPTY DISPOSABLE MISCELLANEOUS
Qty: 100 EACH | Refills: 0 | OUTPATIENT
Start: 2020-09-24

## 2020-09-24 RX ORDER — BENZONATATE 200 MG/1
200 CAPSULE ORAL 3 TIMES DAILY PRN
Qty: 60 CAP | Refills: 0 | Status: SHIPPED | OUTPATIENT
Start: 2020-09-24

## 2020-09-24 RX ORDER — INSULIN GLARGINE 100 [IU]/ML
15 INJECTION, SOLUTION SUBCUTANEOUS EVERY MORNING
Qty: 10 ML | Refills: 3 | Status: SHIPPED | OUTPATIENT
Start: 2020-09-24

## 2020-09-24 RX ORDER — INSULIN GLARGINE 100 [IU]/ML
INJECTION, SOLUTION SUBCUTANEOUS
Qty: 10 ML | Refills: 3 | OUTPATIENT
Start: 2020-09-24

## 2020-09-24 RX ORDER — GLUCOSAMINE HCL/CHONDROITIN SU 500-400 MG
CAPSULE ORAL
Qty: 100 EACH | Refills: 0 | Status: SHIPPED | OUTPATIENT
Start: 2020-09-24

## 2020-09-24 RX ORDER — POTASSIUM CHLORIDE 20 MEQ/1
20 TABLET, EXTENDED RELEASE ORAL DAILY
Qty: 7 TAB | Refills: 0 | Status: SHIPPED | OUTPATIENT
Start: 2020-09-24 | End: 2020-10-01

## 2020-09-24 RX ORDER — LANCETS 30 GAUGE
EACH MISCELLANEOUS
Qty: 100 EACH | Refills: 0 | Status: SHIPPED | OUTPATIENT
Start: 2020-09-24

## 2020-09-24 RX ADMIN — ENOXAPARIN SODIUM 40 MG: 40 INJECTION SUBCUTANEOUS at 04:57

## 2020-09-24 RX ADMIN — FUROSEMIDE 20 MG: 20 TABLET ORAL at 06:14

## 2020-09-24 RX ADMIN — INSULIN LISPRO 3 UNITS: 100 INJECTION, SOLUTION INTRAVENOUS; SUBCUTANEOUS at 11:26

## 2020-09-24 RX ADMIN — DEXAMETHASONE 6 MG: 6 TABLET ORAL at 06:15

## 2020-09-24 RX ADMIN — LISINOPRIL 2.5 MG: 5 TABLET ORAL at 06:14

## 2020-09-24 RX ADMIN — INSULIN GLARGINE 10 UNITS: 100 INJECTION, SOLUTION SUBCUTANEOUS at 08:32

## 2020-09-24 RX ADMIN — METFORMIN HYDROCHLORIDE 500 MG: 500 TABLET ORAL at 08:22

## 2020-09-24 RX ADMIN — POTASSIUM CHLORIDE 20 MEQ: 1500 TABLET, EXTENDED RELEASE ORAL at 04:58

## 2020-09-24 ASSESSMENT — ENCOUNTER SYMPTOMS
SHORTNESS OF BREATH: 1
GASTROINTESTINAL NEGATIVE: 1
CHILLS: 0
CARDIOVASCULAR NEGATIVE: 1
COUGH: 1
NEUROLOGICAL NEGATIVE: 1
NERVOUS/ANXIOUS: 1
FEVER: 0
SPUTUM PRODUCTION: 0
EYES NEGATIVE: 1
MUSCULOSKELETAL NEGATIVE: 1

## 2020-09-24 NOTE — DISCHARGE PLANNING
"Medication reconcilliation completed. Medications delivered to RN at bedside. Written information regarding the dispensed prescriptions was provided to the patient including the phone number of the pharmacy to call for any questions.       Romel Sykes   Home Medication Instructions ROSA M:[id number]    Printed on:09/24/20 [id number]   Medication Information                      Alcohol Swabs  Wipe site with prep pad prior to injection.             benzonatate (TESSALON) 200 MG capsule  Take 1 Cap by mouth 3 times a day as needed for Cough.             Blood Glucose Monitoring Suppl (TRUE METRIX METER) w/Device Kit  Test blood sugar as recommended by provider.             dexamethasone (DECADRON) 2 MG tablet  Take 3 Tablets (6mg) by mouth every day for 6 days.             furosemide (LASIX) 20 MG Tab  Take 1 Tab by mouth every day for 7 days.             glucose blood (TRUE METRIX BLOOD GLUCOSE TEST) strip  Use one strip to test blood sugar three times daily before meals.             insulin glargine (LANTUS) 100 UNIT/ML Solution  Inject 15 unit subcutaneously as instructed every morning and 30 units every evening.             insulin regular (HUMULIN R) 100 Unit/mL Solution  For blood glucose<70 mg/dL snacks,=0u,151-200=2u,201-250=4u,251-300=6u,301-350=9u,351-400=12u,>400=15u callMD             Insulin Syringe-Needle U-100 31G X 5/16\" 0.5 ML Misc  Use one syringe to inject insulin three times daily.             Lancets  Use one True Metrix lancet to test blood sugar three times daily before meals.             lisinopril (PRINIVIL) 2.5 MG Tab  Take 1 Tab by mouth every day.             metFORMIN (GLUCOPHAGE) 500 MG Tab  Take 1 Tab by mouth 2 times a day, with meals.             potassium chloride SA (KDUR) 20 MEQ Tab CR  Take 1 Tab by mouth every day for 7 days. Stop if not taking furosemide.                 "

## 2020-09-24 NOTE — DISCHARGE PLANNING
Anticipated Discharge Disposition:   Home  DME:  Home oxygen (self pay)  Meds to Beds    Action:    Oxygen delivered to bedside.    Pt has paid out-of-pocket for meds and they have been delivered by pharmacist with meds to beds.    Barriers to Discharge:    None    Plan:    DC anticipated today.

## 2020-09-24 NOTE — PROGRESS NOTES
Report received. Assessment completed.  Pt is A&O x4. Pt on 3LO2  No c/o pain.  +BM today.   +void.  Tolerating diet.   Pt up Self.  Call light and belongings within reach. All needs met at this time. Fall Precautions and hourly rounding in place.

## 2020-09-24 NOTE — CONSULTS
Diabetes education: Pt has a hx of diabetes but stated never did anything about it. Pt is COVID + and admitted with blood sugar of 264 and Hg a1c of 11.0%.  With the help of Sami ROBB RN, MAXE was able to speak with pt via telephone ( had tried last night).  Discussed what diabetes was,  type two, hypoglycemia,   hyperglycemia,   sick day,  goals for blood sugars.  Discussed need for carbohydrates and proteins, with every meal and snack as well as why. Discussed the importance of the HS snack with a carbohydrate and protein. Discussed need, benefit and precautions with exercise.  Discussed  what effects blood sugars. Discussed need to follow up with PCP. Pt may have a PCP or at least insurance to start soon to get a PCP. Insulin was discussed as well, insulin storage, shelf life and site rotation. Pt has been giving his insulin and doing finger sticks with nursing.   Reviewed decadron and it's effect on blood sugars. Pt states he is comfortable in given his insulin and doing finger sticks. May need review of meter by nursing if able.  Plan: MAXE TT med to send medications to [facility] pharmacy for approved services and sent message to CM. Per CM note prescriptions went to [facility] instead. Pt does not yet have insurance and could not afford supplies or insulin.  Questions answered and education completed. Pt has a [facility] DM book and briefly reviewed.

## 2020-09-24 NOTE — CARE PLAN
Problem: Communication  Goal: The ability to communicate needs accurately and effectively will improve  Outcome: PROGRESSING AS EXPECTED     Problem: Safety  Goal: Will remain free from injury  Outcome: PROGRESSING AS EXPECTED     Problem: Infection  Goal: Will remain free from infection  Outcome: PROGRESSING AS EXPECTED  Note: Proper PPE in use     Problem: Knowledge Deficit  Goal: Knowledge of disease process/condition, treatment plan, diagnostic tests, and medications will improve  Outcome: PROGRESSING AS EXPECTED  Note: Discussed POC, encouraged and answered questions     Problem: Respiratory:  Goal: Respiratory status will improve  Note: Encouraged TCDB and IS use

## 2020-09-24 NOTE — DISCHARGE INSTRUCTIONS
Discharge Instructions    Discharged to home by car with self. Discharged via wheelchair, hospital escort: Yes.  Special equipment needed: Oxygen    Be sure to schedule a follow-up appointment with your primary care doctor or any specialists as instructed.     Discharge Plan:   Influenza Vaccine Indication: Patient Refuses    I understand that a diet low in cholesterol, fat, and sodium is recommended for good health. Unless I have been given specific instructions below for another diet, I accept this instruction as my diet prescription.   Other diet: Regular diet as tolerated    Special Instructions:   Follow up with primary provider or discharge clinic physician in 1 week.   Check blood glucoses at home and have a log for primary provider to review.   Will need labs to be checked at visit with primary Care  Check blood pressure at home at least twice a day and have a log for primary provider to review  Return to ER in the event of new or worsening symptoms.     Please note importance of compliance and the patient has agreed to proceed with all medical recommendations and follow up plan indicated above.   All medications come with benefits and risks. Risks may include permanent injury or death and these risks can be minimized with close reassessment and monitoring. Please make it to your scheduled follow ups with primary provider and/or specialists clinic.    Given CoVID positive, quarantine if necessary, mask use, social distancing and proper hygiene/hand washing, follow instructions from resource materials provided. For work release letters, call [facility]       INSTRUCTIONS FOR COVID-19 OR ANY OTHER INFECTIOUS RESPIRATORY ILLNESSES    The Centers for Disease Control and Prevention (CDC) states that early indications for COVID-19 include cough, shortness of breath, difficulty breathing, or at least two of the following symptoms: chills, shaking with chills, muscle pain, headache, sore throat, and loss  of taste or smell. Symptoms can range from mild to severe and may appear up to two weeks after exposure to the virus.    The practice of self-isolation and quarantine helps protect the public and your family by  preventing exposure to people who have or may have a contagious disease. Please follow the prevention steps below as based on CDC guidelines:    WHEN TO STOP ISOLATION: Persons with COVID-19 or any other infectious respiratory illness who have symptoms and were advised to care for themselves at home may discontinue home isolation under the following conditions:  · At least 24 hours have passed since recovery defined as resolution of fever without the use of fever-reducing medications; AND,  · Improvement in respiratory symptoms (e.g., cough, shortness of breath); AND,  · At least 10 days have passed since symptoms first appeared and have had no subsequent illness.    MONITOR YOUR SYMPTOMS: If your illness is worsening, seek prompt medical attention. If you have a medical emergency and need to call 911, notify the dispatch personnel that you have, or are being evaluated for confirmed or suspected COVID-19 or another infectious respiratory illness. Wear a facemask if possible.    ACTIVITY RESTRICTION: restrict activities outside your home, except for getting medical care. Do not go to work, school, or public areas. Avoid using public transportation, ride-sharing, or taxis.    SCHEDULED MEDICAL APPOINTMENTS: Notify your provider that you have, or are being evaluated for, confirmed or suspected COVID-19 or another infectious respiratory. This will help the healthcare provider’s office safely take care of you and keep other people from getting exposed or infected.    FACEMASKS, when to wear: Anytime you are away from your home or around other people or pets. If you are unable to wear one, maintain a minimum of 6 feet distancing from others.    LIVING ENVIRONMENT: Stay in a separate room from other people and  pets. If possible, use a separate bathroom, have someone else care for your pets and avoid sharing household items. Any items used should be washed thoroughly with soap and water. Clean all “high-touch” surfaces every day. Use a household cleaning spray or wipe, according to the label instructions. High touch surfaces include (but are not limited to) counters, tabletops, doorknobs, bathroom fixtures, toilets, phones, keyboards, tablets, and bedside tables.     HAND WASHING: Frequently wash hands with soap and water for at least 20 seconds,  especially after blowing your nose, coughing, or sneezing; going to the bathroom; before and after interacting with pets; and before and after eating or preparing food. If hands are visibly dirty use soap and water. If soap and water are not available, use an alcohol-based hand  with at least 60% alcohol. Avoid touching your eyes, nose, and mouth with unwashed hands. Cover your coughs and sneezes with a tissue. Throw used tissues in a lined trash can. Immediately wash your hands.    ACTIVE/FACILITATED SELF-MONITORING: Follow instructions provided by your local health department or health professionals, as appropriate. When working with your local health department check their available hours.    [facility]   Phone Number   [facility] [phone]   [facility] [phone]   [place] Call 211   [place] [phone]     IF YOU HAVE CONFIRMED POSITIVE COVID-19:    Those who have completely recovered from COVID-19 may have immune-boosting antibodies in their plasma--called “convalescent plasma”--that could be used to treat critically ill COVID19 patients.    [facility] is excited to begin working with [facility] on collecting convalescent plasma from  people who have recovered from COVID-19 as part of a program to treat patients infected with the virus. This FDA-approved “emergency investigational new drug” is a special blood product containing antibodies  that may give patients an extra boost to fight the virus.    To be eligible to donate convalescent plasma, you must have a prior COVID-19 diagnosis documented by a laboratory test (or a positive test result for SARS-CoV-2 antibodies) and meet additional eligibility requirements.    If you are interested in donating convalescent plasma or have any additional questions, please contact the [facility]  at [phone] or via e-mail at [email or web address].      Depression / Suicide Risk    As you are discharged from this [facility], it is important to learn how to keep safe from harming yourself.    Recognize the warning signs:  · Abrupt changes in personality, positive or negative- including increase in energy   · Giving away possessions  · Change in eating patterns- significant weight changes-  positive or negative  · Change in sleeping patterns- unable to sleep or sleeping all the time   · Unwillingness or inability to communicate  · Depression  · Unusual sadness, discouragement and loneliness  · Talk of wanting to die  · Neglect of personal appearance   · Rebelliousness- reckless behavior  · Withdrawal from people/activities they love  · Confusion- inability to concentrate     If you or a loved one observes any of these behaviors or has concerns about self-harm, here's what you can do:  · Talk about it- your feelings and reasons for harming yourself  · Remove any means that you might use to hurt yourself (examples: pills, rope, extension cords, firearm)  · Get professional help from the community (Mental Health, Substance Abuse, psychological counseling)  · Do not be alone:Call your Safe Contact- someone whom you trust who will be there for you.  · Call your local CRISIS HOTLINE 588-1152 or 887-353-8016  · Call your local Children's Mobile Crisis Response Team [place] (205) 399-3208 or www.[employer]  · Call the toll free National Suicide  Prevention Hotlines   · National Suicide Prevention Lifeline 289-298-QDGA (7347)  · National Hope Line Network 800-SUICIDE (728-3840)

## 2020-09-24 NOTE — DISCHARGE SUMMARY
Discharge Summary    CHIEF COMPLAINT ON ADMISSION  No chief complaint on file.      Reason for Admission  Cough     Admission Date  9/20/2020    CODE STATUS  Full Code    HPI & HOSPITAL COURSE  This is a 60 y.o. male here with cough, dyspnea, nausea, whole body aches, diarrhea and headache  Please review Dr. Dashawn Sauer M.D. notes for further details of history of present illness, past medical/social/family histories, allergies and medications.   History of poorly controlled diabetes reportedly on oral hypoglycemics.  Recently moved to [place].  Presented with  cough, dyspnea, nausea, whole body aches, diarrhea and headache.  Son came to visit 10-14d ago, son's roommate known CoVIOD positive, son himself wasn't tested. Patient started having above symptoms and tested CoVID POSITIVE 8d prior to admission. He was sent home but his symptoms have worsened prompting him to come in the ED.  At the ED, he is afebrile, slightly hypertensive, somewhat HYPOXIC and out on O2.  CXR:  Interstitial and patchy peripheral airspace opacities raises concern for pneumonitis and Covid 19. Recommend clinical correlation. No pleural effusion.  No leukocytosis. Hyperglycemic.  CoVID POSITIVE  Ddimer<1  Procalc negative.  Decadron started  He improved. O2 titrated down but he will require O2 going home. This is provided.He will also complete Decadron and be on Lasix for 7d or so.    His A1c pankaj back 11. He was seen by diabetes teaching and agustin require SS insulin along with metformin as he will complete steroid course. Diabetes educator came and he will be discharged on SS inulin and DM supplies.    Patient DECLINED [facility] when offered.    At discharge date, Romel Sykes afebrile and hemodynamically stable.  Romel Sykes wanted to be discharged today.    Discharge Physical Exam  Vitals signs and nursing note reviewed.   Constitutional:       Appearance: He is well-developed.      Comments: Pt seen and examined.   HENT:       Head: Normocephalic and atraumatic.   Eyes:      Pupils: Pupils are equal, round, and reactive to light.   Neck:      Musculoskeletal: Normal range of motion and neck supple.   Cardiovascular:      Rate and Rhythm: Normal rate.      Heart sounds: Normal heart sounds.   Pulmonary:      Effort: Pulmonary effort is normal.      Breath sounds: Rhonchi and rales present.   Abdominal:      General: Bowel sounds are normal.      Palpations: Abdomen is soft.   Genitourinary:     Penis: Normal.    Musculoskeletal: Normal range of motion.   Skin:     General: Skin is warm and dry.   Neurological:      Mental Status: He is alert and oriented to person, place, and time.      Motor: Weakness present.   Psychiatric:        Imaging  DX-CHEST-PORTABLE (1 VIEW)   Final Result      Interstitial and patchy peripheral airspace opacities raises concern for pneumonitis and Covid 19. Recommend clinical correlation. No pleural effusion.              Therefore, he is discharged in fair and stable condition to home with close outpatient follow-up.    The patient met 2-midnight criteria for an inpatient stay at the time of discharge.    Discharge Date  9/23/2020    FOLLOW UP ITEMS POST DISCHARGE      DISCHARGE DIAGNOSES  Principal Problem:    Acute respiratory disease due to 2019 novel coronavirus, diabetes POA: Yes  Active Problems:    DM (diabetes mellitus) (HCC) POA: Yes    Pneumonia due to COVID-19 virus POA: Yes        FOLLOW UP  No future appointments.  No follow-up provider specified.  Assign and follow up with primary provider or discharge clinic physician in 1 week. Advise Romel Sykes to check blood glucoses at home and have a log for primary provider to review. If he is started on ACEI will need labs to be checked at that visit for Cr- and K+ (also on Lasix for a week)  Return to ER in the event of new or worsening symptoms. Please note importance of compliance and the patient has agreed to proceed with all medical  "recommendations and follow up plan indicated above. All medications come with benefits and risks. Risks may include permanent injury or death and these risks can be minimized with close reassessment and monitoring. Please make it to your scheduled follow ups with primary provider and/or specialists clinic.  Given CoVID positive, quarantine if necessary, mask use, social distancing and proper hygiene/hand washing, follow instructions from resource materials provided. For work release letters, call [facility] (number provided by nursing instruction).        MEDICATIONS ON DISCHARGE     Medication List      START taking these medications      Instructions   acetaminophen 325 MG Tabs  Commonly known as: TYLENOL   Take 2 Tabs by mouth every 6 hours as needed (Mild Pain; (Pain scale 1-3); Temp greater than 100.5 F).  Dose: 650 mg     B-D SINGLE USE SWABS REGULAR Pads   Doctor's comments: Per formulary preference. ICD-10 code: E11.65 Uncontrolled type 2 Diabetes Mellitus  Wipe site with prep pad prior to injection.     BD Insulin Syringe U/F 31G X 5/16\" 0.5 ML Misc  Generic drug: Insulin Syringe-Needle U-100   Use one syringe to inject insulin three times daily.     benzocaine-menthol 15-3.6 MG Lozg  Commonly known as: CEPACOL   Spray 1 Lozenge in mouth/throat every 2 hours as needed.  Dose: 1 Lozenge     * Blood Glucose Meter Kit   Doctor's comments: Or per formulary preference. ICD-10 code: E11.65 Uncontrolled type 2 Diabetes Mellitus  Test blood sugar as recommended by provider. True Metrix blood glucose monitoring kit.     * Blood Glucose Test Strips   Doctor's comments: Or per formulary preference. ICD-10 code: E11.65 Uncontrolled type 2 Diabetes Mellitus  Use one True Metrix strip to test blood sugar three times daily before meals     * True Metrix Meter w/Device Kit   Test blood sugar as recommended by provider.     dexamethasone 2 MG tablet  Commonly known as: DECADRON   Take 3 Tablets (6mg) by mouth " every day for 6 days.  Dose: 6 mg     furosemide 20 MG Tabs  Commonly known as: LASIX   Take 1 Tab by mouth every day for 7 days.  Dose: 20 mg     glucose 4 g chewable tablet   Take 4 Tabs by mouth as needed for Low Blood Sugar (If FSBG is less than or equal to 70 mg/dL and patient able to eat or drink).  Dose: 16 g     Insulin Syringes U-100 0.5 mL   Doctor's comments: Use one syringe to inject insulin three times daily.  Use one syringe to inject insulin three times daily.     lisinopril 2.5 MG Tabs  Commonly known as: PRINIVIL   Take 1 Tab by mouth every day.  Dose: 2.5 mg     metFORMIN 500 MG Tabs  Commonly known as: GLUCOPHAGE   Take 1 Tab by mouth 2 times a day, with meals.  Dose: 500 mg     NovoLIN R 100 Unit/mL Soln  Generic drug: insulin regular   For blood glucose<70 mg/dL snacks,=0u,151-200=2u,201-250=4u,251-300=6u,301-350=9u,351-400=12u,>400=15u callMD     polyethylene glycol/lytes 17 g Pack  Commonly known as: MIRALAX   Take  by mouth 1 time daily as needed (if sennosides and docusate ineffective after 24 hours).     potassium chloride SA 20 MEQ Tbcr  Commonly known as: Kdur   Doctor's comments: Stop if not taking lasix  Take 1 Tab by mouth every day for 7 days. Stop if not taking furosemide.  Dose: 20 mEq     senna-docusate 8.6-50 MG Tabs  Commonly known as: PERICOLACE or SENOKOT S   Take 2 Tabs by mouth 2 Times a Day.  Dose: 2 Tab     TechLite Lancets Misc   Doctor's comments: Or per formulary preference. ICD-10 code: E11.65 Uncontrolled type 2 Diabetes Mellitus  Use one True Metrix lancet to test blood sugar three times daily before meals.         * This list has 3 medication(s) that are the same as other medications prescribed for you. Read the directions carefully, and ask your doctor or other care provider to review them with you.            CONTINUE taking these medications      Instructions   benzonatate 200 MG capsule  Commonly known as: TESSALON   Take 1 Cap by mouth 3 times a day as  needed for Cough.  Dose: 200 mg     VITAMIN C PO   Take 3 Tabs by mouth every day.  Dose: 3 Tab     VITAMIN D PO   Take 2 Caps by mouth every day.  Dose: 2 Cap        ASK your doctor about these medications      Instructions   * insulin glargine 100 UNIT/ML Soln  Commonly known as: Lantus  Ask about: Which instructions should I use?   Inject 30 Units subcatenously as instructed every evening.  Dose: 30 Units     * insulin glargine 100 UNIT/ML Soln  Commonly known as: Lantus  Ask about: Which instructions should I use?   Inject 15 Units subcutaneously as instructed every morning.  Dose: 15 Units     * Lantus 100 UNIT/ML Soln  Generic drug: insulin glargine  Ask about: Which instructions should I use?   Inject 15 unit subcutaneously as instructed every morning and 30 units every evening.     True Metrix Blood Glucose Test strip  Generic drug: glucose blood   Use one strip to test blood sugar three times daily before meals.         * This list has 3 medication(s) that are the same as other medications prescribed for you. Read the directions carefully, and ask your doctor or other care provider to review them with you.                Allergies  Allergies   Allergen Reactions   • Codeine Rash     RASH   • Penicillins Rash     rash       DIET  Orders Placed This Encounter   Procedures   • Diet Order Diabetic     Standing Status:   Standing     Number of Occurrences:   1     Order Specific Question:   Diet:     Answer:   Diabetic [3]       ACTIVITY  No heavy lifting or strenuous activity    CONSULTATIONS      PROCEDURES  Dx-chest-portable (1 View)    Result Date: 9/20/2020 9/20/2020 7:33 PM HISTORY/REASON FOR EXAM:  Shortness of Breath. TECHNIQUE/EXAM DESCRIPTION AND NUMBER OF VIEWS: Single portable view of the chest. COMPARISON: None FINDINGS: Cardiomediastinal silhouette is normal. Low lung volumes. There are diffuse interstitial and peripheral patchy airspace opacities, nonspecific but suspicious for pneumonitis and  possibly Covid 19. Recommend clinical correlation. No pleural effusion or pneumothorax. No acute osseous abnormality.     Interstitial and patchy peripheral airspace opacities raises concern for pneumonitis and Covid 19. Recommend clinical correlation. No pleural effusion.      LABORATORY  Lab Results   Component Value Date    SODIUM 137 09/22/2020    POTASSIUM 3.8 09/22/2020    CHLORIDE 99 09/22/2020    CO2 25 09/22/2020    GLUCOSE 228 (H) 09/22/2020    BUN 15 09/22/2020    CREATININE 0.72 09/22/2020        Lab Results   Component Value Date    WBC 6.5 09/22/2020    HEMOGLOBIN 14.4 09/22/2020    HEMATOCRIT 42.4 09/22/2020    PLATELETCT 276 09/22/2020        Total time of the discharge process exceeds 40 minutes.

## 2020-09-24 NOTE — PROGRESS NOTES
Diabetes education: Spoke with pt via telephone and education completed.  Please see consult note.  Plan: CDE TT med to send medications to [facility] pharmacy for approved services and sent message to CM. Per CM note prescriptions went to [facility] instead. Pt does not yet have insurance and could not afford supplies or insulin.    Questions answered and education completed. Pt has a [facility] DM book and briefly reviewed.

## 2020-09-24 NOTE — PROGRESS NOTES
"[facility] Medicine Daily Progress Note    Date of Service  9/24/2020    Chief Complaint  60 y.o. male admitted 9/20/2020 with history of diabetes with poor control, had symptoms approximately a week ago and returned positive as an outpatient last Friday, he checked his saturations at home and then became hypoxic triggering him to return to the hospital, the patient placed on 4 L of oxygen, admitted for COVID pneumonia.    Hospital Course    60-year-old diabetic with COVID-19 pneumonia      Interval Problem Update  \"Patient seen and examined today.    Patient tolerating treatment and therapies.  All Data, Medication data reviewed.  Case discussed with nursing as available.  Plan of Care reviewed with patient and notified of changes.  9/21 the patient feels okay, he denies fevers, no nausea vomiting, mild dry cough, still requiring oxygen.\"    Improved. To be discharged today. Awaiting O2 and diabetes for his insulin needs.    Consultants/Specialty  None    Code Status  Full Code    Disposition  discharge      Review of Systems  Review of Systems   Constitutional: Positive for malaise/fatigue. Negative for chills and fever.   HENT: Negative.    Eyes: Negative.    Respiratory: Positive for cough and shortness of breath. Negative for sputum production.    Cardiovascular: Negative.    Gastrointestinal: Negative.    Genitourinary: Negative.    Musculoskeletal: Negative.    Skin: Negative.    Neurological: Negative.    Endo/Heme/Allergies: Negative.    Psychiatric/Behavioral: The patient is nervous/anxious.    All other systems reviewed and are negative.       Physical Exam  Temp:  [36 °C (96.8 °F)-36.6 °C (97.8 °F)] 36.5 °C (97.7 °F)  Pulse:  [] 82  Resp:  [16-24] 18  BP: (113-140)/(61-94) 130/84  SpO2:  [86 %-96 %] 90 %    Physical Exam  Vitals signs and nursing note reviewed.   Constitutional:       Appearance: He is well-developed.      Comments: Pt seen and examined.   HENT:      Head: Normocephalic and " "atraumatic.   Eyes:      Pupils: Pupils are equal, round, and reactive to light.   Neck:      Musculoskeletal: Normal range of motion and neck supple.   Cardiovascular:      Rate and Rhythm: Normal rate.      Heart sounds: Normal heart sounds.   Pulmonary:      Effort: Pulmonary effort is normal.      Breath sounds: Rhonchi and rales present.   Abdominal:      General: Bowel sounds are normal.      Palpations: Abdomen is soft.   Genitourinary:     Penis: Normal.    Musculoskeletal: Normal range of motion.   Skin:     General: Skin is warm and dry.   Neurological:      Mental Status: He is alert and oriented to person, place, and time.      Motor: Weakness present.   Psychiatric:         Behavior: Behavior normal.      Comments: Anxious about going home but declined [facility] when offered         Fluids    Intake/Output Summary (Last 24 hours) at 9/24/2020 0902  Last data filed at 9/23/2020 1800  Gross per 24 hour   Intake 600 ml   Output --   Net 600 ml       Laboratory  Recent Labs     09/22/20  0509   WBC 6.5   RBC 4.88   HEMOGLOBIN 14.4   HEMATOCRIT 42.4   MCV 86.9   MCH 29.5   MCHC 34.0   RDW 38.8   PLATELETCT 276   MPV 10.2     Recent Labs     09/22/20  0509   SODIUM 137   POTASSIUM 3.8   CHLORIDE 99   CO2 25   GLUCOSE 228*   BUN 15   CREATININE 0.72   CALCIUM 9.2                   Imaging  DX-CHEST-PORTABLE (1 VIEW)   Final Result      Interstitial and patchy peripheral airspace opacities raises concern for pneumonitis and Covid 19. Recommend clinical correlation. No pleural effusion.           Assessment/Plan  * Acute respiratory disease due to 2019 novel coronavirus, diabetes- (present on admission)  Assessment & Plan  \"1. Patient will be admitted for supplemental oxygenation.    2. I discussed the benefits of proning with the patient, will do this as tolerated to a goal of 4 hours twice daily.    3. D-dimer is only slightly elevated, would not start full dose anticoagulation, prophylaxis dose enoxaparin has been " "initiated.    4. Patient does not appear hypovolemic at this time will avoid IV fluids.     5. Given his symptom onset greater than 7 days ago, and his new requirement of 3 L nasal cannula to maintain saturation greater than 90%, he would appear to be a candidate for dexamethasone therapy.  This is per the most recent [facility] clinical treatment guidelines updated 8/6/2020.    Close monitoring of clinical parameters, laboratory parameters, if worsening oxygenation to consider Remdesivir\"  Titrate O2. If worse then ID consult for remdesivir  Ddimer<1. Recheck robert if worsening O2.  Procalc low so no bacterial pneumonia  Add low dose Lasix to help improve hypoxia.    Pneumonia due to COVID-19 virus- (present on admission)  Assessment & Plan  As above, close monitoring    DM (diabetes mellitus) ([facility])- (present on admission)  Assessment & Plan  \"Patient with prior poor control  Continue with insulin regimen, adjust as indicated\"  I see no diabetes medications on home meds  Ordered metformin  May need insulin at discharge  Urinalysis showed proteinuria so there is indication for ACEI, started him on low dose.       VTE prophylaxis: Lovenox SQ  Gastrointestinal prophylaxis: None  Antibiotics:   Ordered Anti-infectives (9999h ago, onward)    None        Diet:   Orders Placed This Encounter   Procedures   • Diet Order Diabetic     Standing Status:   Standing     Number of Occurrences:   1     Order Specific Question:   Diet:     Answer:   Diabetic [3]      Prognosis: Guarded  Risk: The Patient is at HIGH risk for inpatient complications and decompensation secondary to his multiple cormorbidities  listed above, especially   Problem   Acute respiratory disease due to 2019 novel coronavirus, diabetes   DM (Diabetes Mellitus) ([facility])      I have personally reviewed notes, labs, vitals, imaging.  I discussed the plan of care with bedside RN as well as on multidisciplinary rounds  I have performed a physical exam and reviewed and " updated ROS and Plan today 9/24/2020. In review of yesterday's note 9/23/2020   there are no changes except as documented above.

## 2020-09-24 NOTE — DISCHARGE PLANNING
Anticipated Discharge Disposition:   Home  DME:  Home oxygen (self pay)  Meds to Beds    Action:    Patient is unable to  medications at [facility] where they were escribed.  Out of pocket for all meds except Lispro is $372.46.  Lispro is not covered with the [employer] card.    Tiger text to Dr. Kilgore to please escribe meds to [facility] pharmacy.    [employer] will contact patient for self pay for oxygen.  Patient agreeable.    Bedside RN Sami informed via voalte.    Barriers to Discharge:    Medication delivery to bedside  Oxygen delivery to bedside    Plan:    F/U with [facility] tomorrow.

## 2020-09-25 LAB
BACTERIA BLD CULT: NORMAL
BACTERIA BLD CULT: NORMAL
SIGNIFICANT IND 70042: NORMAL
SIGNIFICANT IND 70042: NORMAL
SITE SITE: NORMAL
SITE SITE: NORMAL
SOURCE SOURCE: NORMAL
SOURCE SOURCE: NORMAL

## 2020-10-01 ENCOUNTER — NURSE TRIAGE (OUTPATIENT)
Dept: HEALTH INFORMATION MANAGEMENT | Facility: OTHER | Age: 60
End: 2020-10-01

## 2020-10-02 NOTE — TELEPHONE ENCOUNTER
Patient was released from [facility]  On 09/24/2020.  Supplemental oxygen.    [employer] is supplemental Oxygen company.  Patient thinks he is on supplemental oxygen at 2 1/4 liters.    Reason for Disposition  • Caller has medication question only, adult not sick, and triager answers question    Additional Information  • Negative: Drug overdose and triager unable to answer question  • Negative: Caller requesting information unrelated to medicine  • Negative: Caller requesting a prescription for Strep throat and has a positive culture result  • Negative: Rash while taking a medication or within 3 days of stopping it  • Negative: Immunization reaction suspected  • Negative: Asthma and having symptoms of asthma (cough, wheezing, etc.)  • Negative: Breastfeeding questions about mother's medicines and diet  • Negative: MORE THAN A DOUBLE DOSE of a prescription or over-the-counter (OTC) drug  • Negative: DOUBLE DOSE (an extra dose or lesser amount) of over-the-counter (OTC) drug and any symptoms (e.g., dizziness, nausea, pain, sleepiness)  • Negative: DOUBLE DOSE (an extra dose or lesser amount) of prescription drug and any symptoms (e.g., dizziness, nausea, pain, sleepiness)  • Negative: Took another person's prescription drug  • Negative: DOUBLE DOSE (an extra dose or lesser amount) of prescription drug and NO symptoms (Exception: a double dose of antibiotics)  • Negative: Diabetes drug error or overdose (e.g., took wrong type of insulin or took extra dose)  • Negative: Caller has medication question about med not prescribed by PCP and triager unable to answer question (e.g., compatibility with other med, storage)  • Negative: Request for URGENT new prescription or refill of 'essential' medication (i.e., likelihood of harm to patient if not taken) and triager unable to fill per department policy  • Negative: Prescription not at pharmacy and was prescribed today by PCP  • Negative: Pharmacy calling with prescription  "questions and triager unable to answer question  • Negative: Caller has URGENT medication question about med that PCP prescribed and triager unable to answer question  • Negative: Caller has NON-URGENT medication question about med that PCP prescribed and triager unable to answer question  • Negative: Caller requesting a NON-URGENT new prescription or refill and triager unable to refill per department policy  • Negative: DOUBLE DOSE (an extra dose or lesser amount) of antibiotic drug and NO symptoms  • Negative: DOUBLE DOSE (an extra dose or lesser amount) of over-the-counter (OTC) drug and NO symptoms    Answer Assessment - Initial Assessment Questions  1. SYMPTOMS: \"Do you have any symptoms?\"      No  Has questions regarding medications and oxygen  2. SEVERITY: If symptoms are present, ask \"Are they mild, moderate or severe?\"    No symptoms    Protocols used: MEDICATION QUESTION CALL-A-OH      "